# Patient Record
Sex: MALE | Race: WHITE | NOT HISPANIC OR LATINO | URBAN - METROPOLITAN AREA
[De-identification: names, ages, dates, MRNs, and addresses within clinical notes are randomized per-mention and may not be internally consistent; named-entity substitution may affect disease eponyms.]

---

## 2022-11-29 ENCOUNTER — OFFICE (OUTPATIENT)
Dept: URBAN - METROPOLITAN AREA CLINIC 64 | Facility: CLINIC | Age: 42
End: 2022-11-29

## 2022-11-29 VITALS
WEIGHT: 279 LBS | DIASTOLIC BLOOD PRESSURE: 84 MMHG | HEART RATE: 78 BPM | SYSTOLIC BLOOD PRESSURE: 125 MMHG | HEIGHT: 74 IN

## 2022-11-29 DIAGNOSIS — R10.11 RIGHT UPPER QUADRANT PAIN: ICD-10-CM

## 2022-11-29 DIAGNOSIS — B18.2 CHRONIC VIRAL HEPATITIS C: ICD-10-CM

## 2022-11-29 DIAGNOSIS — R74.8 ABNORMAL LEVELS OF OTHER SERUM ENZYMES: ICD-10-CM

## 2022-11-29 PROCEDURE — 99204 OFFICE O/P NEW MOD 45 MIN: CPT

## 2022-12-12 ENCOUNTER — OFFICE VISIT (OUTPATIENT)
Dept: PODIATRY | Facility: CLINIC | Age: 42
End: 2022-12-12

## 2022-12-12 VITALS — BODY MASS INDEX: 34.19 KG/M2 | WEIGHT: 275 LBS | RESPIRATION RATE: 20 BRPM | HEIGHT: 75 IN

## 2022-12-12 DIAGNOSIS — M79.672 BILATERAL FOOT PAIN: Primary | ICD-10-CM

## 2022-12-12 DIAGNOSIS — E11.42 DIABETIC PERIPHERAL NEUROPATHY ASSOCIATED WITH TYPE 2 DIABETES MELLITUS: ICD-10-CM

## 2022-12-12 DIAGNOSIS — M24.573 EQUINUS CONTRACTURE OF ANKLE: ICD-10-CM

## 2022-12-12 DIAGNOSIS — M21.41 PES PLANUS OF BOTH FEET: ICD-10-CM

## 2022-12-12 DIAGNOSIS — M79.671 BILATERAL FOOT PAIN: Primary | ICD-10-CM

## 2022-12-12 DIAGNOSIS — E11.65 TYPE 2 DIABETES MELLITUS WITH HYPERGLYCEMIA, WITH LONG-TERM CURRENT USE OF INSULIN: ICD-10-CM

## 2022-12-12 DIAGNOSIS — Z79.4 TYPE 2 DIABETES MELLITUS WITH HYPERGLYCEMIA, WITH LONG-TERM CURRENT USE OF INSULIN: ICD-10-CM

## 2022-12-12 DIAGNOSIS — M21.42 PES PLANUS OF BOTH FEET: ICD-10-CM

## 2022-12-12 DIAGNOSIS — M20.41 HAMMER TOES OF BOTH FEET: ICD-10-CM

## 2022-12-12 DIAGNOSIS — M20.42 HAMMER TOES OF BOTH FEET: ICD-10-CM

## 2022-12-12 PROCEDURE — 99202 OFFICE O/P NEW SF 15 MIN: CPT

## 2022-12-12 RX ORDER — HYDROCHLOROTHIAZIDE 12.5 MG/1
CAPSULE, GELATIN COATED ORAL
COMMUNITY

## 2022-12-12 RX ORDER — DULOXETIN HYDROCHLORIDE 60 MG/1
CAPSULE, DELAYED RELEASE ORAL
COMMUNITY

## 2022-12-12 RX ORDER — SITAGLIPTIN AND METFORMIN HYDROCHLORIDE 1000; 50 MG/1; MG/1
TABLET, FILM COATED, EXTENDED RELEASE ORAL
COMMUNITY

## 2022-12-12 RX ORDER — ALBUTEROL SULFATE 90 UG/1
AEROSOL, METERED RESPIRATORY (INHALATION)
COMMUNITY

## 2022-12-12 RX ORDER — BUDESONIDE, GLYCOPYRROLATE, AND FORMOTEROL FUMARATE 160; 9; 4.8 UG/1; UG/1; UG/1
2 AEROSOL, METERED RESPIRATORY (INHALATION) 2 TIMES DAILY
COMMUNITY
Start: 2022-11-28

## 2022-12-12 RX ORDER — SEMAGLUTIDE 1.34 MG/ML
INJECTION, SOLUTION SUBCUTANEOUS
COMMUNITY

## 2022-12-12 RX ORDER — DAPAGLIFLOZIN AND SAXAGLIPTIN 10; 5 MG/1; MG/1
1 TABLET, FILM COATED ORAL EVERY MORNING
COMMUNITY
Start: 2022-11-22

## 2022-12-12 RX ORDER — VELPATASVIR AND SOFOSBUVIR 100; 400 MG/1; MG/1
TABLET, FILM COATED ORAL
COMMUNITY

## 2022-12-12 RX ORDER — METFORMIN HYDROCHLORIDE 500 MG/1
TABLET, EXTENDED RELEASE ORAL
COMMUNITY

## 2022-12-12 RX ORDER — NEOMYCIN SULFATE, POLYMYXIN B SULFATE, HYDROCORTISONE 3.5; 10000; 1 MG/ML; [USP'U]/ML; MG/ML
SOLUTION/ DROPS AURICULAR (OTIC)
COMMUNITY

## 2022-12-12 RX ORDER — AMOXICILLIN AND CLAVULANATE POTASSIUM 875; 125 MG/1; MG/1
TABLET, FILM COATED ORAL
COMMUNITY

## 2022-12-12 RX ORDER — GLIMEPIRIDE 4 MG/1
4 TABLET ORAL 2 TIMES DAILY WITH MEALS
COMMUNITY
Start: 2022-11-14

## 2022-12-12 RX ORDER — LOSARTAN POTASSIUM AND HYDROCHLOROTHIAZIDE 25; 100 MG/1; MG/1
1 TABLET ORAL DAILY
COMMUNITY
Start: 2022-11-28

## 2022-12-12 RX ORDER — INSULIN ASPART 100 [IU]/ML
INJECTION, SOLUTION INTRAVENOUS; SUBCUTANEOUS
COMMUNITY

## 2022-12-12 RX ORDER — INSULIN DEGLUDEC INJECTION 100 U/ML
INJECTION, SOLUTION SUBCUTANEOUS
COMMUNITY

## 2022-12-12 RX ORDER — MONTELUKAST SODIUM 10 MG/1
TABLET ORAL
COMMUNITY

## 2022-12-12 RX ORDER — LEVOTHYROXINE SODIUM 0.05 MG/1
50 TABLET ORAL DAILY
COMMUNITY
Start: 2022-11-22

## 2022-12-12 RX ORDER — ONDANSETRON 8 MG/1
TABLET, ORALLY DISINTEGRATING ORAL
COMMUNITY

## 2022-12-12 NOTE — PROGRESS NOTES
12/12/2022  Foot and Ankle Surgery - New Patient   Provider: ALLY Jackson   Location: [facility]    Subjective:  Shaun Shanks is a 42 y.o. male.     Chief Complaint   Patient presents with   • Right Foot - Hammer Toe, Peripheral Neuropathy   • Left Foot - Hammer Toe   • Initial Evaluation     ALAINA Wallace md    • Peripheral Neuropathy       The patient is a 42-year-old male who presents as a new patient for bilateral foot pain.    The patient reports experiencing numbness and decreased sensation in his bilateral feet most of the time as well as an occasional severe burning sensation on the dorsal and plantar aspects when he is ambulating. His symptoms have being ongoing for a couple of years; however, they have recently worsened. He reports having a signifcant amount of cramps as well. He denies any throbbing or aching due to reducing his activity when the pain begins. His symptoms are exacerbated the day after he has been very active. He confirms being diabetic and notes his blood glucose levels continue to be elevated; however, he has a pump ordered that his endocrinology nurse practitioner is able to control remotely. The patient reports his A1c was recently completed and was informed it was elevated. He denies eating carbohydrates and eats a significant amount of salads. He adds previously weighing 600 pounds. He denies being very active nor on his feet often. He also denies any known significant wounds to his feet. He notes having back issues for some time. The patient reports needing new shoes due to his dog destroying the insoles of his current shoes. He denies his feet are cold.    He confirms seeing Dr. Wallace in primary care.    The patient reports being advised to have a surgery for blood clots.    No Known Allergies    Past Medical History:   Diagnosis Date   • Diabetes mellitus (HCC)    • DVT (deep venous thrombosis) ([facility])    • Heart disease    • Hypertension        History  reviewed. No pertinent surgical history.    Family History   Problem Relation Age of Onset   • Heart disease Mother    • Diabetes Father        Social History     Socioeconomic History   • Marital status: Single   Tobacco Use   • Smoking status: Every Day     Types: Cigarettes   • Smokeless tobacco: Never   Vaping Use   • Vaping Use: Never used   Substance and Sexual Activity   • Alcohol use: Not Currently   • Drug use: Never   • Sexual activity: Defer        Current Outpatient Medications on File Prior to Visit   Medication Sig Dispense Refill   • albuterol sulfate  (90 Base) MCG/ACT inhaler albuterol sulfate HFA 90 mcg/actuation aerosol inhaler     • Breztri Aerosphere 160-9-4.8 MCG/ACT aerosol inhaler Inhale 2 puffs 2 (Two) Times a Day.     • DULoxetine (CYMBALTA) 60 MG capsule Cymbalta 60 mg capsule,delayed release     • glimepiride (AMARYL) 4 MG tablet Take 4 mg by mouth 2 (Two) Times a Day With Meals.     • hydroCHLOROthiazide (MICROZIDE) 12.5 MG capsule hydrochlorothiazide 12.5 mg capsule   TAKE ONE CAPSULE BY MOUTH EVERY MORNING     • insulin aspart (NovoLOG FlexPen) 100 UNIT/ML solution pen-injector sc pen Novolog Flexpen U-100 Insulin aspart 100 unit/mL (3 mL) subcutaneous   INJECT 6 UNITS SUBCUTANEOUSLY UP TO 3 TIMES A DAY 15 MINUTES BEFORE MEALS     • insulin degludec (Tresiba FlexTouch) 100 UNIT/ML solution pen-injector injection Tresiba FlexTouch U-100 insulin 100 unit/mL (3 mL) subcutaneous pen   inject 40 UNITS SUBCUTANEOUSLY EVERY MORNING     • levothyroxine (SYNTHROID, LEVOTHROID) 50 MCG tablet Take 50 mcg by mouth Daily.     • losartan-hydrochlorothiazide (HYZAAR) 100-25 MG per tablet Take 1 tablet by mouth Daily.     • metFORMIN ER (GLUCOPHAGE-XR) 500 MG 24 hr tablet metformin  mg tablet,extended release 24 hr   TAKE TWO TABLETS BY MOUTH EVERY MORNING     • montelukast (SINGULAIR) 10 MG tablet montelukast 10 mg tablet     • neomycin-polymyxin-hydrocortisone (CORTISPORIN) 1 % solution  otic solution neomycin-polymyxin-hydrocort 3.5 mg/mL-10,000 unit/mL-1 % ear solution   INSTILL 4 DROPS INTO THE AFFECTED EAR THREE TIMES DAILY FOR 7-10 DAYS     • ondansetron ODT (ZOFRAN-ODT) 8 MG disintegrating tablet ondansetron 8 mg disintegrating tablet   DISSOLVE ONE TABLET UNDER THE TONGUE EVERY 12 HOURS AS NEEDED FOR NAUSEA     • permethrin (NIX) 1 % liquid Lice Treatment (permethrin) 1 % topical liquid   apply a sufficient AMOUNT of shampoo AND allow TO remain ON HAIR FOR 10 MINUTES BEFORE rinsing off with water     • Qtern 10-5 MG tablet Take 1 tablet by mouth Every Morning.     • Semaglutide, 1 MG/DOSE, (Ozempic, 1 MG/DOSE,) 2 MG/1.5ML solution pen-injector Ozempic 1 mg/dose (2 mg/1.5 mL) subcutaneous pen injector   INJECT 1 MG SUBCUTANEOUSLY ONCE a WEEK     • SITagliptin-metFORMIN HCl ER (Janumet XR)  MG tablet Janumet XR 50 mg-1,000 mg tablet,extended release   Take 1 tablet every day by oral route.     • Sofosbuvir-Velpatasvir 400-100 MG tablet Epclusa 400 mg-100 mg tablet     • amoxicillin-clavulanate (AUGMENTIN) 875-125 MG per tablet amoxicillin 875 mg-potassium clavulanate 125 mg tablet       No current facility-administered medications on file prior to visit.       General: Denies fever, chills, fatigue, and weakness.  Eyes: Denies vision loss, blurry vision, and excessive redness.  ENT: Denies hearing issues and difficulty swallowing.  Cardiovascular: Denies palpitations, chest pain, or syncopal episodes.  Respiratory: Denies shortness of breath, wheezing, and coughing.  GI: Denies abdominal pain, nausea, and vomiting.   : Denies frequency, hematuria, and urgency.  Musculoskeletal: Denies joint pains, and stiffness. Positive bilateral feet cramps. Bilateral feet numbness and burning.  Derm: Denies rash, open wounds, or suspicious lesions.  Neuro: Denies headaches, numbness, loss of coordination, and tremors.  Psych: Denies anxiety and depression.  Endocrine: Denies temperature intolerance  "and changes in appetite.  Heme: Denies bleeding disorders or abnormal bruising.     Objective   Resp 20   Ht 190.5 cm (75\")   Wt 125 kg (275 lb)   BMI 34.37 kg/m²     Foot/Ankle Exam:       General:   Appearance: appears stated age and healthy    Orientation: AAOx3    Affect: appropriate      VASCULAR      Right Foot Vascularity   Normal vascular exam    Dorsalis pedis:  2+  Posterior tibial:  2+  Skin Temperature: warm    Edema Grading:  None  CFT:  < 3 seconds  Pedal Hair Growth:  Present  Varicosities: none       Left Foot Vascularity   Normal vascular exam    Dorsalis pedis:  2+  Posterior tibial:  2+  Skin Temperature: warm    Edema Grading:  None  CFT:  < 3 seconds  Pedal Hair Growth:  Present  Varicosities: none       MUSCULOSKELETAL      Right Foot Musculoskeletal   Ecchymosis:  None  Tenderness: none    Arch:  Pes planus     Left Foot Musculoskeletal   Ecchymosis:  None  Tenderness: none    Arch:  Pes planus     MUSCLE STRENGTH     Right Foot Muscle Strength   Normal strength    Foot dorsiflexion:  5  Foot plantar flexion:  5  Foot inversion:  5  Foot eversion:  5     DERMATOLOGIC     Right Foot Dermatologic   Skin: skin intact    Nails: normal       Left Foot Dermatologic   Skin: skin intact    Nails: normal        Right Foot Additional Comments Positive hammertoe. Decreased protective sensation 7 out of 7.      Left Foot Additional Comments: Positive hammertoe. Decreased protective sensation 7 out of 7.        Assessment & Plan   Diagnoses and all orders for this visit:    1. Bilateral foot pain (Primary)  -     XR Foot 3+ View Bilateral    2. Type 2 diabetes mellitus with hyperglycemia, with long-term current use of insulin (HCC)    3. Diabetic peripheral neuropathy associated with type 2 diabetes mellitus (HCC)    4. Hammer toes of both feet    5. Pes planus of both feet    6. Equinus contracture of ankle        The patient's X-ray was reviewed today. I do feel the patient is at moderate risk for " pedal complications related to diabetes. Explained importance of diabetic foot care, daily foot checks, and glycemic control. Patient should check both feet on a daily basis, monitor and control blood sugars, make sure that both feet and in between toes are towel dried after baths or showers. Avoid barefoot walking at all times. Check shoes before putting them on.     Patient was given information on proper foot care. Call the office at the first signs of a wound or with signs of infection.. I do feel the patient is having possible signs and symptoms of neuropathic pain in addition to lack of support and stabilization in his shoe. He was to perform checks on his feet and to not ambulate without shoes. The patient purchased insoles for his shoes today. He will continue working to reduce blood glucose levels.    Will recommend topical pain cream for symptom relief as well as otc insoles and calf stretching exercises.      Orders Placed This Encounter   Procedures   • XR Foot 3+ View Bilateral     Order Specific Question:   Reason for Exam:     Answer:   arnaud hammer toe, neuropathy   room 10  wb     Order Specific Question:   Does this patient have a diabetic monitoring/medication delivering device on?     Answer:   No     Order Specific Question:   Release to patient     Answer:   Routine Release        Transcribed from ambient dictation for ALLY Ordaz by Lennox Saxena.  12/12/22   13:14 EST    Patient or patient representative verbalized consent to the visit recording.  I have personally performed the services described in this document as transcribed by the above individual, and it is both accurate and complete.  ALLY Ordaz  12/12/2022  14:07 EST

## 2023-04-18 ENCOUNTER — OFFICE (OUTPATIENT)
Dept: URBAN - NONMETROPOLITAN AREA CLINIC 10 | Facility: CLINIC | Age: 43
End: 2023-04-18

## 2023-04-18 VITALS
HEIGHT: 74 IN | SYSTOLIC BLOOD PRESSURE: 147 MMHG | HEART RATE: 63 BPM | WEIGHT: 314 LBS | DIASTOLIC BLOOD PRESSURE: 84 MMHG

## 2023-04-18 DIAGNOSIS — K80.20 CALCULUS OF GALLBLADDER WITHOUT CHOLECYSTITIS WITHOUT OBSTRU: ICD-10-CM

## 2023-04-18 DIAGNOSIS — B18.2 CHRONIC VIRAL HEPATITIS C: ICD-10-CM

## 2023-04-18 DIAGNOSIS — J44.9 CHRONIC OBSTRUCTIVE PULMONARY DISEASE, UNSPECIFIED: ICD-10-CM

## 2023-04-18 DIAGNOSIS — R74.8 ABNORMAL LEVELS OF OTHER SERUM ENZYMES: ICD-10-CM

## 2023-04-18 DIAGNOSIS — R76.8 OTHER SPECIFIED ABNORMAL IMMUNOLOGICAL FINDINGS IN SERUM: ICD-10-CM

## 2023-04-18 DIAGNOSIS — E11.9 TYPE 2 DIABETES MELLITUS WITHOUT COMPLICATIONS: ICD-10-CM

## 2023-04-18 DIAGNOSIS — F17.200 NICOTINE DEPENDENCE, UNSPECIFIED, UNCOMPLICATED: ICD-10-CM

## 2023-04-18 PROCEDURE — 99214 OFFICE O/P EST MOD 30 MIN: CPT | Performed by: NURSE PRACTITIONER

## 2023-07-18 ENCOUNTER — OFFICE (OUTPATIENT)
Dept: URBAN - NONMETROPOLITAN AREA CLINIC 10 | Facility: CLINIC | Age: 43
End: 2023-07-18

## 2023-07-18 VITALS
HEART RATE: 99 BPM | DIASTOLIC BLOOD PRESSURE: 75 MMHG | HEIGHT: 74 IN | SYSTOLIC BLOOD PRESSURE: 134 MMHG | WEIGHT: 303 LBS

## 2023-07-18 DIAGNOSIS — R76.8 OTHER SPECIFIED ABNORMAL IMMUNOLOGICAL FINDINGS IN SERUM: ICD-10-CM

## 2023-07-18 DIAGNOSIS — R74.8 ABNORMAL LEVELS OF OTHER SERUM ENZYMES: ICD-10-CM

## 2023-07-18 DIAGNOSIS — K80.20 CALCULUS OF GALLBLADDER WITHOUT CHOLECYSTITIS WITHOUT OBSTRU: ICD-10-CM

## 2023-07-18 DIAGNOSIS — B18.2 CHRONIC VIRAL HEPATITIS C: ICD-10-CM

## 2023-07-18 DIAGNOSIS — R11.2 NAUSEA WITH VOMITING, UNSPECIFIED: ICD-10-CM

## 2023-07-18 PROCEDURE — 99213 OFFICE O/P EST LOW 20 MIN: CPT | Performed by: NURSE PRACTITIONER

## 2023-10-31 ENCOUNTER — OFFICE (OUTPATIENT)
Dept: URBAN - METROPOLITAN AREA CLINIC 64 | Facility: CLINIC | Age: 43
End: 2023-10-31

## 2023-10-31 VITALS
WEIGHT: 300 LBS | HEART RATE: 64 BPM | SYSTOLIC BLOOD PRESSURE: 132 MMHG | HEIGHT: 74 IN | DIASTOLIC BLOOD PRESSURE: 79 MMHG

## 2023-10-31 DIAGNOSIS — R76.8 OTHER SPECIFIED ABNORMAL IMMUNOLOGICAL FINDINGS IN SERUM: ICD-10-CM

## 2023-10-31 DIAGNOSIS — B18.2 CHRONIC VIRAL HEPATITIS C: ICD-10-CM

## 2023-10-31 DIAGNOSIS — R74.8 ABNORMAL LEVELS OF OTHER SERUM ENZYMES: ICD-10-CM

## 2023-10-31 DIAGNOSIS — K80.20 CALCULUS OF GALLBLADDER WITHOUT CHOLECYSTITIS WITHOUT OBSTRU: ICD-10-CM

## 2023-10-31 PROCEDURE — 99214 OFFICE O/P EST MOD 30 MIN: CPT | Performed by: NURSE PRACTITIONER

## 2024-01-30 ENCOUNTER — TELEPHONE (OUTPATIENT)
Dept: ORTHOPEDIC SURGERY | Facility: CLINIC | Age: 44
End: 2024-01-30
Payer: MEDICARE

## 2024-02-06 ENCOUNTER — OFFICE (OUTPATIENT)
Dept: URBAN - METROPOLITAN AREA CLINIC 64 | Facility: CLINIC | Age: 44
End: 2024-02-06

## 2024-02-06 VITALS
DIASTOLIC BLOOD PRESSURE: 67 MMHG | SYSTOLIC BLOOD PRESSURE: 98 MMHG | HEART RATE: 99 BPM | HEIGHT: 74 IN | WEIGHT: 294 LBS

## 2024-02-06 DIAGNOSIS — R10.11 RIGHT UPPER QUADRANT PAIN: ICD-10-CM

## 2024-02-06 DIAGNOSIS — R11.2 NAUSEA WITH VOMITING, UNSPECIFIED: ICD-10-CM

## 2024-02-06 DIAGNOSIS — R76.8 OTHER SPECIFIED ABNORMAL IMMUNOLOGICAL FINDINGS IN SERUM: ICD-10-CM

## 2024-02-06 DIAGNOSIS — B18.2 CHRONIC VIRAL HEPATITIS C: ICD-10-CM

## 2024-02-06 DIAGNOSIS — K80.20 CALCULUS OF GALLBLADDER WITHOUT CHOLECYSTITIS WITHOUT OBSTRU: ICD-10-CM

## 2024-02-06 DIAGNOSIS — R74.8 ABNORMAL LEVELS OF OTHER SERUM ENZYMES: ICD-10-CM

## 2024-02-06 PROCEDURE — 99213 OFFICE O/P EST LOW 20 MIN: CPT | Performed by: NURSE PRACTITIONER

## 2024-02-21 ENCOUNTER — OFFICE VISIT (OUTPATIENT)
Dept: SURGERY | Facility: CLINIC | Age: 44
End: 2024-02-21
Payer: MEDICARE

## 2024-02-21 VITALS
DIASTOLIC BLOOD PRESSURE: 91 MMHG | RESPIRATION RATE: 18 BRPM | OXYGEN SATURATION: 97 % | HEIGHT: 73 IN | WEIGHT: 297.4 LBS | BODY MASS INDEX: 39.42 KG/M2 | TEMPERATURE: 97.3 F | HEART RATE: 93 BPM | SYSTOLIC BLOOD PRESSURE: 130 MMHG

## 2024-02-21 DIAGNOSIS — K80.20 GALLSTONES: Primary | ICD-10-CM

## 2024-02-21 DIAGNOSIS — R94.5 ABNORMAL RESULTS OF LIVER FUNCTION STUDIES: ICD-10-CM

## 2024-02-21 PROCEDURE — 1160F RVW MEDS BY RX/DR IN RCRD: CPT | Performed by: SURGERY

## 2024-02-21 PROCEDURE — 1159F MED LIST DOCD IN RCRD: CPT | Performed by: SURGERY

## 2024-02-21 PROCEDURE — 99204 OFFICE O/P NEW MOD 45 MIN: CPT | Performed by: SURGERY

## 2024-02-21 RX ORDER — SODIUM CHLORIDE 9 MG/ML
100 INJECTION, SOLUTION INTRAVENOUS CONTINUOUS
OUTPATIENT
Start: 2024-02-21

## 2024-02-21 RX ORDER — CHLORHEXIDINE GLUCONATE ORAL RINSE 1.2 MG/ML
15 SOLUTION DENTAL EVERY 12 HOURS SCHEDULED
OUTPATIENT
Start: 2024-02-21

## 2024-02-21 RX ORDER — VELPATASVIR AND SOFOSBUVIR 100; 400 MG/1; MG/1
TABLET, FILM COATED ORAL
COMMUNITY

## 2024-02-21 RX ORDER — BUPROPION HYDROCHLORIDE 150 MG/1
150 TABLET ORAL EVERY MORNING
COMMUNITY
Start: 2024-01-18

## 2024-02-23 NOTE — H&P (VIEW-ONLY)
GENERAL SURGERY CONSULTATION NOTE    Consult requested by: ALLY Corbin    Patient Care Team:  Diann Wallace MD as PCP - General (Family Medicine)  Antoine Ivey MD as Surgeon (General Surgery)    Reason for consult: Gallstones    Subjective     Patient is a 43 y.o. male presents with abdominal pain which started approximately 1 year ago.  He reports waking up in the morning with epigastric discomfort, nausea, vomiting.  He states that at first it was only once or twice per month and now it is 2-3 times per week.  He is followed by gastroenterology for elevated liver enzymes.  He has a history of diabetes with blood sugars ranging from 1  daily, COPD, hepatitis B, and chronic hepatitis C.  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  He had an MRI with MRCP which demonstrated a contracted gallbladder around a 6 mm stone without pericholecystic inflammation.  No abnormal biliary ductal dilatation was noted.  Pancreatic duct appears unremarkable.  There were no acute findings within the abdomen, but the patient was noted to have hepatic steatosis.     Review of Systems   Gastrointestinal:  Positive for abdominal pain, nausea and vomiting.        History  Past Medical History:   Diagnosis Date    Diabetes mellitus     DVT (deep venous thrombosis)     Heart disease     Hypertension      History reviewed. No pertinent surgical history.  Family History   Problem Relation Age of Onset    Heart disease Mother     Diabetes Father      Social History     Tobacco Use    Smoking status: Every Day     Types: Cigarettes     Passive exposure: Current    Smokeless tobacco: Never   Vaping Use    Vaping Use: Some days   Substance Use Topics    Alcohol use: Not Currently    Drug use: Never     (Not in a hospital admission)    Allergies:  Patient has no known allergies.    Objective     Vital Signs  /91 (BP Location: Left  "arm, Patient Position: Sitting, Cuff Size: Large Adult)   Pulse 93   Temp 97.3 °F (36.3 °C) (Infrared)   Resp 18   Ht 185.4 cm (73\")   Wt 135 kg (297 lb 6.4 oz)   SpO2 97%   BMI 39.24 kg/m²      Physical Exam  Vitals reviewed.   Constitutional:       Appearance: He is well-developed. He is obese.   HENT:      Head: Normocephalic and atraumatic.   Eyes:      Pupils: Pupils are equal, round, and reactive to light.   Cardiovascular:      Rate and Rhythm: Normal rate and regular rhythm.   Pulmonary:      Effort: Pulmonary effort is normal.      Breath sounds: Normal breath sounds.   Abdominal:      General: There is no distension.      Palpations: Abdomen is soft.      Tenderness: There is no abdominal tenderness.      Hernia: No hernia is present.   Musculoskeletal:         General: Normal range of motion.      Cervical back: Normal range of motion.   Lymphadenopathy:      Cervical: No cervical adenopathy.   Skin:     General: Skin is warm and dry.      Findings: No rash.   Neurological:      Mental Status: He is alert and oriented to person, place, and time.         Results Review:   Lab Results (last 24 hours)       ** No results found for the last 24 hours. **          No radiology results for the last day      I reviewed the patient's new imaging results and agree with the interpretation.  I reviewed the patient's other test results and agree with the interpretation    Assessment & Plan   Gallstones    I discussed with the patient the findings of gallstones, and reviewed the natural history of all stones as well as management options which include both operative and nonoperative strategies.  We discussed the risk, benefits, and alternatives to laparoscopic cholecystectomy which include but are not limited to: bleeding, infection, damage to surrounding structures including bowel and common bile duct, cystic stump leak, and possible need to convert to open.  The patient understands, and agrees to proceed with " surgery.   It was discussed with the patient that his symptoms may be multifactorial given his hepatitis C and hepatic steatosis.  This may not make a difference with regards to his elevated liver enzymes either.    I discussed the patients findings and my recommendations with the patient.     Antoine Ivey MD  02/23/24  10:04 EST

## 2024-03-01 RX ORDER — PREGABALIN 50 MG/1
50 CAPSULE ORAL 2 TIMES DAILY
COMMUNITY

## 2024-03-06 ENCOUNTER — HOSPITAL ENCOUNTER (OUTPATIENT)
Dept: CARDIOLOGY | Facility: HOSPITAL | Age: 44
Discharge: HOME | End: 2024-03-06
Payer: MEDICARE

## 2024-03-06 ENCOUNTER — HOSPITAL ENCOUNTER (OUTPATIENT)
Dept: GENERAL RADIOLOGY | Facility: HOSPITAL | Age: 44
Discharge: HOME | End: 2024-03-06
Payer: MEDICARE

## 2024-03-06 ENCOUNTER — LAB (OUTPATIENT)
Dept: LAB | Facility: HOSPITAL | Age: 44
End: 2024-03-06
Payer: MEDICARE

## 2024-03-06 DIAGNOSIS — R94.5 ABNORMAL RESULTS OF LIVER FUNCTION STUDIES: ICD-10-CM

## 2024-03-06 DIAGNOSIS — K80.20 GALLSTONES: ICD-10-CM

## 2024-03-06 LAB
ALBUMIN SERPL-MCNC: 4.5 G/DL (ref 3.5–5.2)
ALBUMIN/GLOB SERPL: 1.2 G/DL
ALP SERPL-CCNC: 127 U/L (ref 39–117)
ALT SERPL W P-5'-P-CCNC: 37 U/L (ref 1–41)
ANION GAP SERPL CALCULATED.3IONS-SCNC: 13.7 MMOL/L (ref 5–15)
AST SERPL-CCNC: 28 U/L (ref 1–40)
BASOPHILS # BLD AUTO: 0.08 10*3/MM3 (ref 0–0.2)
BASOPHILS NFR BLD AUTO: 0.8 % (ref 0–1.5)
BILIRUB SERPL-MCNC: 0.4 MG/DL (ref 0–1.2)
BUN SERPL-MCNC: 16 MG/DL (ref 6–20)
BUN/CREAT SERPL: 14.8 (ref 7–25)
CALCIUM SPEC-SCNC: 9.5 MG/DL (ref 8.6–10.5)
CHLORIDE SERPL-SCNC: 98 MMOL/L (ref 98–107)
CO2 SERPL-SCNC: 25.3 MMOL/L (ref 22–29)
CREAT SERPL-MCNC: 1.08 MG/DL (ref 0.76–1.27)
DEPRECATED RDW RBC AUTO: 39.9 FL (ref 37–54)
EGFRCR SERPLBLD CKD-EPI 2021: 86.2 ML/MIN/1.73
EOSINOPHIL # BLD AUTO: 0.22 10*3/MM3 (ref 0–0.4)
EOSINOPHIL NFR BLD AUTO: 2.3 % (ref 0.3–6.2)
ERYTHROCYTE [DISTWIDTH] IN BLOOD BY AUTOMATED COUNT: 12.3 % (ref 12.3–15.4)
GLOBULIN UR ELPH-MCNC: 3.8 GM/DL
GLUCOSE SERPL-MCNC: 108 MG/DL (ref 65–99)
HCT VFR BLD AUTO: 45.3 % (ref 37.5–51)
HGB BLD-MCNC: 15.2 G/DL (ref 13–17.7)
IMM GRANULOCYTES # BLD AUTO: 0.03 10*3/MM3 (ref 0–0.05)
IMM GRANULOCYTES NFR BLD AUTO: 0.3 % (ref 0–0.5)
INR PPP: 0.94 (ref 0.93–1.1)
LYMPHOCYTES # BLD AUTO: 2.83 10*3/MM3 (ref 0.7–3.1)
LYMPHOCYTES NFR BLD AUTO: 29.5 % (ref 19.6–45.3)
MCH RBC QN AUTO: 30.5 PG (ref 26.6–33)
MCHC RBC AUTO-ENTMCNC: 33.6 G/DL (ref 31.5–35.7)
MCV RBC AUTO: 91 FL (ref 79–97)
MONOCYTES # BLD AUTO: 0.59 10*3/MM3 (ref 0.1–0.9)
MONOCYTES NFR BLD AUTO: 6.2 % (ref 5–12)
NEUTROPHILS NFR BLD AUTO: 5.83 10*3/MM3 (ref 1.7–7)
NEUTROPHILS NFR BLD AUTO: 60.9 % (ref 42.7–76)
NRBC BLD AUTO-RTO: 0 /100 WBC (ref 0–0.2)
PLATELET # BLD AUTO: 231 10*3/MM3 (ref 140–450)
PMV BLD AUTO: 11.6 FL (ref 6–12)
POTASSIUM SERPL-SCNC: 4.4 MMOL/L (ref 3.5–5.2)
PROT SERPL-MCNC: 8.3 G/DL (ref 6–8.5)
PROTHROMBIN TIME: 10.3 SECONDS (ref 9.6–11.7)
RBC # BLD AUTO: 4.98 10*6/MM3 (ref 4.14–5.8)
SODIUM SERPL-SCNC: 137 MMOL/L (ref 136–145)
WBC NRBC COR # BLD AUTO: 9.58 10*3/MM3 (ref 3.4–10.8)

## 2024-03-06 PROCEDURE — 36415 COLL VENOUS BLD VENIPUNCTURE: CPT

## 2024-03-06 PROCEDURE — 71046 X-RAY EXAM CHEST 2 VIEWS: CPT

## 2024-03-06 PROCEDURE — 93005 ELECTROCARDIOGRAM TRACING: CPT | Performed by: SURGERY

## 2024-03-06 PROCEDURE — 85025 COMPLETE CBC W/AUTO DIFF WBC: CPT

## 2024-03-06 PROCEDURE — 85610 PROTHROMBIN TIME: CPT

## 2024-03-06 PROCEDURE — 80053 COMPREHEN METABOLIC PANEL: CPT

## 2024-03-11 LAB
QT INTERVAL: 360 MS
QTC INTERVAL: 416 MS

## 2024-03-13 ENCOUNTER — ANESTHESIA EVENT (OUTPATIENT)
Dept: PERIOP | Facility: HOSPITAL | Age: 44
End: 2024-03-13
Payer: MEDICARE

## 2024-03-14 ENCOUNTER — HOSPITAL ENCOUNTER (INPATIENT)
Facility: HOSPITAL | Age: 44
LOS: 6 days | Discharge: HOME | DRG: 330 | End: 2024-03-20
Attending: SURGERY | Admitting: SURGERY
Payer: MEDICARE

## 2024-03-14 ENCOUNTER — ANESTHESIA (OUTPATIENT)
Dept: PERIOP | Facility: HOSPITAL | Age: 44
End: 2024-03-14
Payer: MEDICARE

## 2024-03-14 DIAGNOSIS — K31.6 DUODENAL FISTULA: Primary | ICD-10-CM

## 2024-03-14 DIAGNOSIS — K80.20 GALLSTONES: ICD-10-CM

## 2024-03-14 PROBLEM — Z90.49 S/P CHOLECYSTECTOMY: Status: ACTIVE | Noted: 2024-03-14

## 2024-03-14 LAB
GLUCOSE BLDC GLUCOMTR-MCNC: 138 MG/DL (ref 70–105)
GLUCOSE BLDC GLUCOMTR-MCNC: 175 MG/DL (ref 70–105)
GLUCOSE BLDC GLUCOMTR-MCNC: 210 MG/DL (ref 70–105)

## 2024-03-14 PROCEDURE — 25010000002 MIDAZOLAM PER 1 MG: Performed by: NURSE ANESTHETIST, CERTIFIED REGISTERED

## 2024-03-14 PROCEDURE — 0FT40ZZ RESECTION OF GALLBLADDER, OPEN APPROACH: ICD-10-PCS | Performed by: SURGERY

## 2024-03-14 PROCEDURE — 94640 AIRWAY INHALATION TREATMENT: CPT

## 2024-03-14 PROCEDURE — 25810000003 SODIUM CHLORIDE 0.9 % SOLUTION: Performed by: SURGERY

## 2024-03-14 PROCEDURE — 44602 SUTURE SMALL INTESTINE: CPT | Performed by: SURGERY

## 2024-03-14 PROCEDURE — 25010000002 HYDROMORPHONE 1 MG/ML SOLUTION: Performed by: NURSE ANESTHETIST, CERTIFIED REGISTERED

## 2024-03-14 PROCEDURE — 44602 SUTURE SMALL INTESTINE: CPT | Performed by: NURSE PRACTITIONER

## 2024-03-14 PROCEDURE — 25010000002 SUGAMMADEX 200 MG/2ML SOLUTION: Performed by: NURSE ANESTHETIST, CERTIFIED REGISTERED

## 2024-03-14 PROCEDURE — 25010000002 MAGNESIUM SULFATE PER 500 MG OF MAGNESIUM: Performed by: NURSE ANESTHETIST, CERTIFIED REGISTERED

## 2024-03-14 PROCEDURE — 25810000003 LACTATED RINGERS PER 1000 ML: Performed by: ANESTHESIOLOGY

## 2024-03-14 PROCEDURE — 25010000002 HYDRALAZINE PER 20 MG: Performed by: NURSE ANESTHETIST, CERTIFIED REGISTERED

## 2024-03-14 PROCEDURE — 25010000002 LABETALOL 5 MG/ML SOLUTION: Performed by: NURSE ANESTHETIST, CERTIFIED REGISTERED

## 2024-03-14 PROCEDURE — 25010000002 CEFOXITIN PER 1 G: Performed by: SURGERY

## 2024-03-14 PROCEDURE — 25010000002 DEXAMETHASONE PER 1 MG: Performed by: NURSE ANESTHETIST, CERTIFIED REGISTERED

## 2024-03-14 PROCEDURE — 25010000002 BUPIVACAINE (PF) 0.25 % SOLUTION: Performed by: SURGERY

## 2024-03-14 PROCEDURE — 0DU907Z SUPPLEMENT DUODENUM WITH AUTOLOGOUS TISSUE SUBSTITUTE, OPEN APPROACH: ICD-10-PCS | Performed by: SURGERY

## 2024-03-14 PROCEDURE — 82948 REAGENT STRIP/BLOOD GLUCOSE: CPT

## 2024-03-14 PROCEDURE — 94761 N-INVAS EAR/PLS OXIMETRY MLT: CPT

## 2024-03-14 PROCEDURE — 25010000002 PIPERACILLIN SOD-TAZOBACTAM PER 1 G: Performed by: SURGERY

## 2024-03-14 PROCEDURE — 25010000002 FENTANYL CITRATE (PF) 100 MCG/2ML SOLUTION: Performed by: NURSE ANESTHETIST, CERTIFIED REGISTERED

## 2024-03-14 PROCEDURE — 25010000002 HYDROMORPHONE 1 MG/ML SOLUTION: Performed by: SURGERY

## 2024-03-14 PROCEDURE — 25010000002 PROPOFOL 200 MG/20ML EMULSION: Performed by: NURSE ANESTHETIST, CERTIFIED REGISTERED

## 2024-03-14 PROCEDURE — 25010000002 ONDANSETRON PER 1 MG: Performed by: NURSE ANESTHETIST, CERTIFIED REGISTERED

## 2024-03-14 PROCEDURE — 94799 UNLISTED PULMONARY SVC/PX: CPT

## 2024-03-14 DEVICE — LIGAMAX 5 MM ENDOSCOPIC MULTIPLE CLIP APPLIER
Type: IMPLANTABLE DEVICE | Site: ABDOMEN | Status: FUNCTIONAL
Brand: LIGAMAX

## 2024-03-14 RX ORDER — HYDRALAZINE HYDROCHLORIDE 20 MG/ML
10 INJECTION INTRAMUSCULAR; INTRAVENOUS EVERY 6 HOURS PRN
Status: DISCONTINUED | OUTPATIENT
Start: 2024-03-14 | End: 2024-03-16 | Stop reason: SDUPTHER

## 2024-03-14 RX ORDER — CHLORHEXIDINE GLUCONATE ORAL RINSE 1.2 MG/ML
15 SOLUTION DENTAL EVERY 12 HOURS SCHEDULED
Status: DISCONTINUED | OUTPATIENT
Start: 2024-03-14 | End: 2024-03-14

## 2024-03-14 RX ORDER — ONDANSETRON 2 MG/ML
4 INJECTION INTRAMUSCULAR; INTRAVENOUS ONCE AS NEEDED
Status: DISCONTINUED | OUTPATIENT
Start: 2024-03-14 | End: 2024-03-14 | Stop reason: HOSPADM

## 2024-03-14 RX ORDER — ONDANSETRON 2 MG/ML
4 INJECTION INTRAMUSCULAR; INTRAVENOUS EVERY 6 HOURS PRN
Status: DISCONTINUED | OUTPATIENT
Start: 2024-03-14 | End: 2024-03-20 | Stop reason: HOSPADM

## 2024-03-14 RX ORDER — OLANZAPINE 10 MG/2ML
5 INJECTION, POWDER, LYOPHILIZED, FOR SOLUTION INTRAMUSCULAR EVERY 8 HOURS PRN
Status: DISCONTINUED | OUTPATIENT
Start: 2024-03-14 | End: 2024-03-20 | Stop reason: HOSPADM

## 2024-03-14 RX ORDER — BUPIVACAINE HYDROCHLORIDE 2.5 MG/ML
INJECTION, SOLUTION EPIDURAL; INFILTRATION; INTRACAUDAL AS NEEDED
Status: DISCONTINUED | OUTPATIENT
Start: 2024-03-14 | End: 2024-03-14 | Stop reason: HOSPADM

## 2024-03-14 RX ORDER — PANTOPRAZOLE SODIUM 40 MG/10ML
40 INJECTION, POWDER, LYOPHILIZED, FOR SOLUTION INTRAVENOUS EVERY 12 HOURS SCHEDULED
Status: DISCONTINUED | OUTPATIENT
Start: 2024-03-14 | End: 2024-03-16

## 2024-03-14 RX ORDER — LIDOCAINE HYDROCHLORIDE 20 MG/ML
INJECTION, SOLUTION EPIDURAL; INFILTRATION; INTRACAUDAL; PERINEURAL AS NEEDED
Status: DISCONTINUED | OUTPATIENT
Start: 2024-03-14 | End: 2024-03-14 | Stop reason: SURG

## 2024-03-14 RX ORDER — MEPERIDINE HYDROCHLORIDE 25 MG/ML
12.5 INJECTION INTRAMUSCULAR; INTRAVENOUS; SUBCUTANEOUS
Status: DISCONTINUED | OUTPATIENT
Start: 2024-03-14 | End: 2024-03-14 | Stop reason: HOSPADM

## 2024-03-14 RX ORDER — DEXMEDETOMIDINE HYDROCHLORIDE 100 UG/ML
INJECTION, SOLUTION INTRAVENOUS AS NEEDED
Status: DISCONTINUED | OUTPATIENT
Start: 2024-03-14 | End: 2024-03-14 | Stop reason: SURG

## 2024-03-14 RX ORDER — LABETALOL HYDROCHLORIDE 5 MG/ML
5 INJECTION, SOLUTION INTRAVENOUS
Status: DISCONTINUED | OUTPATIENT
Start: 2024-03-14 | End: 2024-03-14 | Stop reason: HOSPADM

## 2024-03-14 RX ORDER — FLUMAZENIL 0.1 MG/ML
0.2 INJECTION INTRAVENOUS AS NEEDED
Status: DISCONTINUED | OUTPATIENT
Start: 2024-03-14 | End: 2024-03-14 | Stop reason: HOSPADM

## 2024-03-14 RX ORDER — MIDAZOLAM HYDROCHLORIDE 1 MG/ML
INJECTION INTRAMUSCULAR; INTRAVENOUS AS NEEDED
Status: DISCONTINUED | OUTPATIENT
Start: 2024-03-14 | End: 2024-03-14 | Stop reason: SURG

## 2024-03-14 RX ORDER — OXYCODONE HYDROCHLORIDE 5 MG/1
5 TABLET ORAL ONCE AS NEEDED
Status: DISCONTINUED | OUTPATIENT
Start: 2024-03-14 | End: 2024-03-14 | Stop reason: HOSPADM

## 2024-03-14 RX ORDER — ROCURONIUM BROMIDE 10 MG/ML
INJECTION, SOLUTION INTRAVENOUS AS NEEDED
Status: DISCONTINUED | OUTPATIENT
Start: 2024-03-14 | End: 2024-03-14 | Stop reason: SURG

## 2024-03-14 RX ORDER — IPRATROPIUM BROMIDE AND ALBUTEROL SULFATE 2.5; .5 MG/3ML; MG/3ML
3 SOLUTION RESPIRATORY (INHALATION) ONCE AS NEEDED
Status: COMPLETED | OUTPATIENT
Start: 2024-03-14 | End: 2024-03-14

## 2024-03-14 RX ORDER — OXYCODONE HYDROCHLORIDE 5 MG/1
10 TABLET ORAL EVERY 4 HOURS PRN
Status: DISCONTINUED | OUTPATIENT
Start: 2024-03-14 | End: 2024-03-14 | Stop reason: HOSPADM

## 2024-03-14 RX ORDER — NALOXONE HCL 0.4 MG/ML
0.4 VIAL (ML) INJECTION
Status: DISCONTINUED | OUTPATIENT
Start: 2024-03-14 | End: 2024-03-20 | Stop reason: HOSPADM

## 2024-03-14 RX ORDER — ONDANSETRON 4 MG/1
4 TABLET, ORALLY DISINTEGRATING ORAL EVERY 6 HOURS PRN
Status: DISCONTINUED | OUTPATIENT
Start: 2024-03-14 | End: 2024-03-20 | Stop reason: HOSPADM

## 2024-03-14 RX ORDER — KETAMINE HCL IN NACL, ISO-OSM 100MG/10ML
SYRINGE (ML) INJECTION AS NEEDED
Status: DISCONTINUED | OUTPATIENT
Start: 2024-03-14 | End: 2024-03-14 | Stop reason: SURG

## 2024-03-14 RX ORDER — SODIUM CHLORIDE 9 MG/ML
100 INJECTION, SOLUTION INTRAVENOUS CONTINUOUS
Status: DISCONTINUED | OUTPATIENT
Start: 2024-03-14 | End: 2024-03-14

## 2024-03-14 RX ORDER — PROPOFOL 10 MG/ML
INJECTION, EMULSION INTRAVENOUS AS NEEDED
Status: DISCONTINUED | OUTPATIENT
Start: 2024-03-14 | End: 2024-03-14 | Stop reason: SURG

## 2024-03-14 RX ORDER — DIPHENHYDRAMINE HYDROCHLORIDE 50 MG/ML
12.5 INJECTION INTRAMUSCULAR; INTRAVENOUS ONCE AS NEEDED
Status: DISCONTINUED | OUTPATIENT
Start: 2024-03-14 | End: 2024-03-14 | Stop reason: HOSPADM

## 2024-03-14 RX ORDER — FENTANYL CITRATE 50 UG/ML
INJECTION, SOLUTION INTRAMUSCULAR; INTRAVENOUS AS NEEDED
Status: DISCONTINUED | OUTPATIENT
Start: 2024-03-14 | End: 2024-03-14 | Stop reason: SURG

## 2024-03-14 RX ORDER — NALOXONE HCL 0.4 MG/ML
0.4 VIAL (ML) INJECTION AS NEEDED
Status: DISCONTINUED | OUTPATIENT
Start: 2024-03-14 | End: 2024-03-14 | Stop reason: HOSPADM

## 2024-03-14 RX ORDER — SODIUM CHLORIDE 0.9 % (FLUSH) 0.9 %
10 SYRINGE (ML) INJECTION AS NEEDED
Status: DISCONTINUED | OUTPATIENT
Start: 2024-03-14 | End: 2024-03-14 | Stop reason: HOSPADM

## 2024-03-14 RX ORDER — DEXAMETHASONE SODIUM PHOSPHATE 4 MG/ML
INJECTION, SOLUTION INTRA-ARTICULAR; INTRALESIONAL; INTRAMUSCULAR; INTRAVENOUS; SOFT TISSUE AS NEEDED
Status: DISCONTINUED | OUTPATIENT
Start: 2024-03-14 | End: 2024-03-14 | Stop reason: SURG

## 2024-03-14 RX ORDER — SODIUM CHLORIDE, SODIUM LACTATE, POTASSIUM CHLORIDE, CALCIUM CHLORIDE 600; 310; 30; 20 MG/100ML; MG/100ML; MG/100ML; MG/100ML
9 INJECTION, SOLUTION INTRAVENOUS CONTINUOUS PRN
Status: DISCONTINUED | OUTPATIENT
Start: 2024-03-14 | End: 2024-03-14 | Stop reason: HOSPADM

## 2024-03-14 RX ORDER — HYDRALAZINE HYDROCHLORIDE 20 MG/ML
5 INJECTION INTRAMUSCULAR; INTRAVENOUS
Status: DISCONTINUED | OUTPATIENT
Start: 2024-03-14 | End: 2024-03-14 | Stop reason: HOSPADM

## 2024-03-14 RX ORDER — ALBUTEROL SULFATE 90 UG/1
2 AEROSOL, METERED RESPIRATORY (INHALATION) EVERY 4 HOURS PRN
Status: DISCONTINUED | OUTPATIENT
Start: 2024-03-14 | End: 2024-03-20 | Stop reason: HOSPADM

## 2024-03-14 RX ORDER — ONDANSETRON 2 MG/ML
INJECTION INTRAMUSCULAR; INTRAVENOUS AS NEEDED
Status: DISCONTINUED | OUTPATIENT
Start: 2024-03-14 | End: 2024-03-14 | Stop reason: SURG

## 2024-03-14 RX ORDER — LABETALOL HYDROCHLORIDE 5 MG/ML
INJECTION, SOLUTION INTRAVENOUS AS NEEDED
Status: DISCONTINUED | OUTPATIENT
Start: 2024-03-14 | End: 2024-03-14 | Stop reason: SURG

## 2024-03-14 RX ORDER — SODIUM CHLORIDE 9 MG/ML
100 INJECTION, SOLUTION INTRAVENOUS CONTINUOUS
Status: DISCONTINUED | OUTPATIENT
Start: 2024-03-14 | End: 2024-03-20 | Stop reason: HOSPADM

## 2024-03-14 RX ORDER — MAGNESIUM SULFATE HEPTAHYDRATE 500 MG/ML
INJECTION, SOLUTION INTRAMUSCULAR; INTRAVENOUS AS NEEDED
Status: DISCONTINUED | OUTPATIENT
Start: 2024-03-14 | End: 2024-03-14 | Stop reason: SURG

## 2024-03-14 RX ORDER — NALOXONE HCL 0.4 MG/ML
0.1 VIAL (ML) INJECTION
Status: DISCONTINUED | OUTPATIENT
Start: 2024-03-14 | End: 2024-03-20 | Stop reason: HOSPADM

## 2024-03-14 RX ORDER — EPHEDRINE SULFATE 5 MG/ML
5 INJECTION INTRAVENOUS ONCE AS NEEDED
Status: DISCONTINUED | OUTPATIENT
Start: 2024-03-14 | End: 2024-03-14 | Stop reason: HOSPADM

## 2024-03-14 RX ORDER — SODIUM CHLORIDE 0.9 % (FLUSH) 0.9 %
10 SYRINGE (ML) INJECTION EVERY 12 HOURS SCHEDULED
Status: DISCONTINUED | OUTPATIENT
Start: 2024-03-14 | End: 2024-03-14 | Stop reason: HOSPADM

## 2024-03-14 RX ORDER — DIPHENHYDRAMINE HYDROCHLORIDE 50 MG/ML
12.5 INJECTION INTRAMUSCULAR; INTRAVENOUS
Status: DISCONTINUED | OUTPATIENT
Start: 2024-03-14 | End: 2024-03-14 | Stop reason: HOSPADM

## 2024-03-14 RX ORDER — IPRATROPIUM BROMIDE AND ALBUTEROL SULFATE 2.5; .5 MG/3ML; MG/3ML
3 SOLUTION RESPIRATORY (INHALATION)
Status: DISCONTINUED | OUTPATIENT
Start: 2024-03-14 | End: 2024-03-20 | Stop reason: HOSPADM

## 2024-03-14 RX ADMIN — 0.12% CHLORHEXIDINE GLUCONATE 15 ML: 1.2 RINSE ORAL at 09:41

## 2024-03-14 RX ADMIN — FENTANYL CITRATE 50 MCG: 50 INJECTION, SOLUTION INTRAMUSCULAR; INTRAVENOUS at 12:30

## 2024-03-14 RX ADMIN — DEXMEDETOMIDINE HCL 2 MCG: 100 INJECTION INTRAVENOUS at 15:10

## 2024-03-14 RX ADMIN — CEFOXITIN 2 G: 2 INJECTION, POWDER, FOR SOLUTION INTRAVENOUS at 12:34

## 2024-03-14 RX ADMIN — ROCURONIUM BROMIDE 10 MG: 10 INJECTION, SOLUTION INTRAVENOUS at 12:55

## 2024-03-14 RX ADMIN — IPRATROPIUM BROMIDE AND ALBUTEROL SULFATE 3 ML: .5; 2.5 SOLUTION RESPIRATORY (INHALATION) at 15:45

## 2024-03-14 RX ADMIN — ROCURONIUM BROMIDE 10 MG: 10 INJECTION, SOLUTION INTRAVENOUS at 13:58

## 2024-03-14 RX ADMIN — SODIUM CHLORIDE 100 ML/HR: 9 INJECTION, SOLUTION INTRAVENOUS at 20:34

## 2024-03-14 RX ADMIN — MIDAZOLAM 2 MG: 1 INJECTION INTRAMUSCULAR; INTRAVENOUS at 15:13

## 2024-03-14 RX ADMIN — IPRATROPIUM BROMIDE AND ALBUTEROL SULFATE 3 ML: .5; 3 SOLUTION RESPIRATORY (INHALATION) at 20:15

## 2024-03-14 RX ADMIN — ROCURONIUM BROMIDE 20 MG: 10 INJECTION, SOLUTION INTRAVENOUS at 13:25

## 2024-03-14 RX ADMIN — ONDANSETRON 4 MG: 2 INJECTION INTRAMUSCULAR; INTRAVENOUS at 14:54

## 2024-03-14 RX ADMIN — PIPERACILLIN AND TAZOBACTAM 4.5 G: 4; .5 INJECTION, POWDER, FOR SOLUTION INTRAVENOUS at 18:45

## 2024-03-14 RX ADMIN — PROPOFOL 20 MG: 10 INJECTION, EMULSION INTRAVENOUS at 15:07

## 2024-03-14 RX ADMIN — HYDROMORPHONE HYDROCHLORIDE 0.5 MG: 1 INJECTION, SOLUTION INTRAMUSCULAR; INTRAVENOUS; SUBCUTANEOUS at 18:45

## 2024-03-14 RX ADMIN — PROPOFOL 200 MG: 10 INJECTION, EMULSION INTRAVENOUS at 12:30

## 2024-03-14 RX ADMIN — Medication 20 MG: at 13:57

## 2024-03-14 RX ADMIN — DEXAMETHASONE SODIUM PHOSPHATE 4 MG: 4 INJECTION, SOLUTION INTRAMUSCULAR; INTRAVENOUS at 12:49

## 2024-03-14 RX ADMIN — LABETALOL 20 MG/4 ML (5 MG/ML) INTRAVENOUS SYRINGE 5 MG: at 13:00

## 2024-03-14 RX ADMIN — DEXMEDETOMIDINE HCL 4 MCG: 100 INJECTION INTRAVENOUS at 15:07

## 2024-03-14 RX ADMIN — SODIUM CHLORIDE, POTASSIUM CHLORIDE, SODIUM LACTATE AND CALCIUM CHLORIDE 9 ML/HR: 600; 310; 30; 20 INJECTION, SOLUTION INTRAVENOUS at 09:41

## 2024-03-14 RX ADMIN — Medication 20 MG: at 14:14

## 2024-03-14 RX ADMIN — PANTOPRAZOLE SODIUM 40 MG: 40 INJECTION, POWDER, FOR SOLUTION INTRAVENOUS at 20:34

## 2024-03-14 RX ADMIN — HYDROMORPHONE HYDROCHLORIDE 0.5 MG: 1 INJECTION, SOLUTION INTRAMUSCULAR; INTRAVENOUS; SUBCUTANEOUS at 20:54

## 2024-03-14 RX ADMIN — HYDROMORPHONE HYDROCHLORIDE 0.5 MG: 1 INJECTION, SOLUTION INTRAMUSCULAR; INTRAVENOUS; SUBCUTANEOUS at 12:54

## 2024-03-14 RX ADMIN — HYDROMORPHONE HYDROCHLORIDE 1 MG: 1 INJECTION, SOLUTION INTRAMUSCULAR; INTRAVENOUS; SUBCUTANEOUS at 15:48

## 2024-03-14 RX ADMIN — Medication 5 MG: at 16:04

## 2024-03-14 RX ADMIN — HYDROMORPHONE HYDROCHLORIDE 0.5 MG: 1 INJECTION, SOLUTION INTRAMUSCULAR; INTRAVENOUS; SUBCUTANEOUS at 15:13

## 2024-03-14 RX ADMIN — ROCURONIUM BROMIDE 40 MG: 10 INJECTION, SOLUTION INTRAVENOUS at 12:30

## 2024-03-14 RX ADMIN — HYDROMORPHONE HYDROCHLORIDE 0.5 MG: 1 INJECTION, SOLUTION INTRAMUSCULAR; INTRAVENOUS; SUBCUTANEOUS at 15:00

## 2024-03-14 RX ADMIN — Medication 5 MG: at 17:30

## 2024-03-14 RX ADMIN — SODIUM CHLORIDE, POTASSIUM CHLORIDE, SODIUM LACTATE AND CALCIUM CHLORIDE: 600; 310; 30; 20 INJECTION, SOLUTION INTRAVENOUS at 14:04

## 2024-03-14 RX ADMIN — DEXMEDETOMIDINE HCL 2 MCG: 100 INJECTION INTRAVENOUS at 15:00

## 2024-03-14 RX ADMIN — CEFOXITIN 2 G: 2 INJECTION, POWDER, FOR SOLUTION INTRAVENOUS at 14:34

## 2024-03-14 RX ADMIN — HYDROMORPHONE HYDROCHLORIDE 0.5 MG: 1 INJECTION, SOLUTION INTRAMUSCULAR; INTRAVENOUS; SUBCUTANEOUS at 12:47

## 2024-03-14 RX ADMIN — HYDROMORPHONE HYDROCHLORIDE 0.5 MG: 1 INJECTION, SOLUTION INTRAMUSCULAR; INTRAVENOUS; SUBCUTANEOUS at 15:07

## 2024-03-14 RX ADMIN — MIDAZOLAM 2 MG: 1 INJECTION INTRAMUSCULAR; INTRAVENOUS at 12:24

## 2024-03-14 RX ADMIN — MAGNESIUM SULFATE HEPTAHYDRATE 2 G: 500 INJECTION, SOLUTION INTRAMUSCULAR; INTRAVENOUS at 13:57

## 2024-03-14 RX ADMIN — SUGAMMADEX 200 MG: 100 INJECTION, SOLUTION INTRAVENOUS at 14:56

## 2024-03-14 RX ADMIN — HYDROMORPHONE HYDROCHLORIDE 0.5 MG: 1 INJECTION, SOLUTION INTRAMUSCULAR; INTRAVENOUS; SUBCUTANEOUS at 14:14

## 2024-03-14 RX ADMIN — LIDOCAINE HYDROCHLORIDE 40 MG: 20 INJECTION, SOLUTION EPIDURAL; INFILTRATION; INTRACAUDAL; PERINEURAL at 12:30

## 2024-03-14 RX ADMIN — HYDROMORPHONE HYDROCHLORIDE 1 MG: 1 INJECTION, SOLUTION INTRAMUSCULAR; INTRAVENOUS; SUBCUTANEOUS at 16:05

## 2024-03-14 RX ADMIN — DEXMEDETOMIDINE HCL 2 MCG: 100 INJECTION INTRAVENOUS at 15:09

## 2024-03-14 RX ADMIN — HYDRALAZINE HYDROCHLORIDE 5 MG: 20 INJECTION INTRAMUSCULAR; INTRAVENOUS at 18:00

## 2024-03-14 RX ADMIN — ROCURONIUM BROMIDE 10 MG: 10 INJECTION, SOLUTION INTRAVENOUS at 13:41

## 2024-03-14 RX ADMIN — MUPIROCIN 2 APPLICATION: 20 OINTMENT TOPICAL at 09:41

## 2024-03-14 RX ADMIN — FENTANYL CITRATE 50 MCG: 50 INJECTION, SOLUTION INTRAMUSCULAR; INTRAVENOUS at 12:34

## 2024-03-14 RX ADMIN — DEXMEDETOMIDINE HCL 4 MCG: 100 INJECTION INTRAVENOUS at 14:44

## 2024-03-14 RX ADMIN — DEXMEDETOMIDINE HCL 6 MCG: 100 INJECTION INTRAVENOUS at 14:39

## 2024-03-14 RX ADMIN — PROPOFOL 20 MG: 10 INJECTION, EMULSION INTRAVENOUS at 15:04

## 2024-03-14 NOTE — ANESTHESIA PROCEDURE NOTES
Airway  Urgency: elective    Date/Time: 3/14/2024 12:32 PM  Airway not difficult    General Information and Staff    Patient location during procedure: OR  CRNA/CAA: Maryana Baez, CRNA    Indications and Patient Condition  Indications for airway management: airway protection    Preoxygenated: yes  MILS maintained throughout  Mask difficulty assessment: 2 - vent by mask + OA or adjuvant +/- NMBA    Final Airway Details  Final airway type: endotracheal airway      Successful airway: ETT  Cuffed: yes   Successful intubation technique: video laryngoscopy  Facilitating devices/methods: intubating stylet  Endotracheal tube insertion site: oral  Blade: Medina  Blade size: 3  ETT size (mm): 7.5  Cormack-Lehane Classification: grade I - full view of glottis  Placement verified by: bronchoscopy   Measured from: lips  ETT/EBT  to lips (cm): 23  Number of attempts at approach: 1  Assessment: lips, teeth, and gum same as pre-op and atraumatic intubation

## 2024-03-14 NOTE — ANESTHESIA POSTPROCEDURE EVALUATION
Patient: Shaun Shanks    Procedure Summary       Date: 03/14/24 Room / Location: [facility] OR  / [facility] OR    Anesthesia Start: 1224 Anesthesia Stop: 1513    Procedure: LAPAROSCOPIC cholecystectomy converted to open subtotal cholecystectomy with repair of duodenal fistula with modified gram patch (Abdomen) Diagnosis:       Gallstones      (Gallstones [K80.20])    Surgeons: Antoine Ivey MD Provider: Kodi Delacruz MD    Anesthesia Type: general ASA Status: 3            Anesthesia Type: general    Vitals  Vitals Value Taken Time   /96 03/14/24 1535   Temp 96.9 °F (36.1 °C) 03/14/24 1518   Pulse 82 03/14/24 1536   Resp 18 03/14/24 1528   SpO2 98 % 03/14/24 1536   Vitals shown include unfiled device data.        Post Anesthesia Care and Evaluation    Patient location during evaluation: PACU  Patient participation: complete - patient participated  Level of consciousness: awake  Pain scale: See nurse's notes for pain score.  Pain management: adequate    Airway patency: patent  Anesthetic complications: No anesthetic complications  PONV Status: none  Cardiovascular status: acceptable  Respiratory status: acceptable and spontaneous ventilation  Hydration status: acceptable    Comments: Patient seen and examined postoperatively; vital signs stable; SpO2 greater than or equal to 90%; cardiopulmonary status stable; nausea/vomiting adequately controlled; pain adequately controlled; no apparent anesthesia complications; patient discharged from anesthesia care when discharge criteria were met

## 2024-03-14 NOTE — CONSULTS
History and Physical   Shaun Shanks : 1980 MRN:[id number] LOS:0     Reason for admission: Gallstones     Assessment / Plan     # Cholelithiasis status post open subtotal cholecystectomy  -Patient is admitted for elective surgery.  Patient has symptomatic cholelithiasis.  -Patient close surgery laparoscopic cholecystectomy converted to open subtotal cholecystectomy on .  -Patient is very anxious..  Blood pressure in the high side.  -pain med per surgery team   -zyprexa added   -now strict npo and so will need to resume PO home meds once clinically  appropriate    #Anxiety  -zyprexa as needed's ordered.    #COPD  -DuoNeb    #Diabetes mellitus  -  N.p.o. currently.  Started on SSI and once patient is able to eat    #Hypertension  -Hydralazine as needed is ordered.  Currently strict NPO.  Resume home medication once clinically appropriate          Nutrition: No diet orders on file     DVT Prophylaxis:   Mechanical Order History:        Ordered        Signed and Held  Place Sequential Compression Device  Once            Signed and Held  Place Sequential Compression Device  Once            Signed and Held  Maintain Sequential Compression Device  Continuous                          Pharmalogical Order History:       None             History of Present illness     A 43 y.o. old male patient with PMH of anxiety COPD diabetes mellitus hypertension DVT presents to the hospital with complaints of elective surgery for cholecystectomy.  Patient is follow-up with surgeon for symptomatic cholelithiasis.  Got laparoscopic cholecystectomy converted to open subtotal cholecystectomy with repair of duodenal fistula and modified gram patch.  Hospitalist team is consulted for medical management.      Subjective / Review of systems     Review of Systems   Pain in surgery area     Past Medical/Surgical/Social/Family History & Allergies     Past Medical History:   Diagnosis Date    Abdominal pain     Anxiety     COPD  (chronic obstructive pulmonary disease)     Diabetes mellitus     Disease of thyroid gland     DVT (deep venous thrombosis)     Heart disease     Hepatitis C     Hypertension       Past Surgical History:   Procedure Laterality Date    KNEE ARTHROSCOPY Right     MULTIPLE TOOTH EXTRACTIONS      TONSILLECTOMY        Social History     Socioeconomic History    Marital status: Single   Tobacco Use    Smoking status: Every Day     Current packs/day: 0.75     Average packs/day: 0.8 packs/day for 16.0 years (12.0 ttl pk-yrs)     Types: Cigarettes     Passive exposure: Current    Smokeless tobacco: Never   Vaping Use    Vaping status: Some Days    Substances: Nicotine, Flavoring   Substance and Sexual Activity    Alcohol use: Yes     Comment: occasional    Drug use: Never    Sexual activity: Defer      Family History   Problem Relation Age of Onset    Heart disease Mother     Diabetes Father       No Known Allergies     Home Medications     Prior to Admission medications    Medication Sig Start Date End Date Taking? Authorizing Provider   albuterol sulfate  (90 Base) MCG/ACT inhaler Inhale 2 puffs Every 4 (Four) Hours As Needed.   Yes Mitchell Shah MD Breztri Aerosphere 160-9-4.8 MCG/ACT aerosol inhaler Inhale 2 puffs 2 (Two) Times a Day. 11/28/22  Yes Mitchell Shah MD   buPROPion XL (WELLBUTRIN XL) 150 MG 24 hr tablet Take 1 tablet by mouth Every Morning. 1/18/24  Yes Mitchell Shah MD   DULoxetine (CYMBALTA) 60 MG capsule Take 1 capsule by mouth 2 (Two) Times a Day.   Yes Mitchell Shah MD   glimepiride (AMARYL) 4 MG tablet Take 1 tablet by mouth 2 (Two) Times a Day With Meals. 11/14/22  Yes Mitchell Shah MD   levothyroxine (SYNTHROID, LEVOTHROID) 50 MCG tablet Take 1 tablet by mouth Daily. 11/22/22  Yes Mitchell Shah MD   losartan-hydrochlorothiazide (HYZAAR) 100-25 MG per tablet Take 1 tablet by mouth Daily. 11/28/22  Yes Mitchell Shah MD   metFORMIN ER  "(GLUCOPHAGE-XR) 500 MG 24 hr tablet Take 2 tablets by mouth Daily With Breakfast.   Yes ProviderMitchell MD   montelukast (SINGULAIR) 10 MG tablet Take 1 tablet by mouth Every Night.   Yes Mitchell Shah MD   ondansetron ODT (ZOFRAN-ODT) 8 MG disintegrating tablet ondansetron 8 mg disintegrating tablet   DISSOLVE ONE TABLET UNDER THE TONGUE EVERY 12 HOURS AS NEEDED FOR NAUSEA   Yes Mitchell Shah MD   pregabalin (LYRICA) 50 MG capsule Take 1 capsule by mouth 2 (Two) Times a Day.   Yes ProviderMitchell MD   Sofosbuvir-Velpatasvir 400-100 MG tablet Take 1 tablet by mouth Daily.   Yes Mitchell Shah MD      Objective / Physical Exam   Vital signs:  Temp: 96.9 °F (36.1 °C)  BP: 173/86  Heart Rate: 63  Resp: 12  SpO2: 93 %  Weight: 128 kg (283 lb)    Admission Weight: Weight: 124 kg (274 lb)    Physical Exam   Physical Exam  HENT:      Head: Normocephalic and atraumatic.      Nose: Nose normal.   Eyes:      Extraocular Movements: Extraocular movements intact.      Conjunctivae/sclera: Conjunctivae normal.      Pupils: Pupils are equal, round, and reactive to light.   Cardiovascular:      Rate and Rhythm: normal       Pulses: Normal pulses.      Heart sounds: Normal heart sounds.   Pulmonary:      Effort: normal      Breath sounds: normal   Abdominal:      Surgical site with clean dressing    Musculoskeletal:         General: Normal range of motion.      Cervical back: Normal range of motion and neck supple.   Skin:     General: Skin is dry.   Neurological:      General: No focal deficit present.      Mental Status: alert.   Psychiatric:         Mood and Affect: anxious        Labs         Invalid input(s): \"HBG\"          Current Medications   Scheduled Meds:chlorhexidine, 15 mL, Mouth/Throat, Q12H  mupirocin, , Nasal, BID         Continuous Infusions:lactated ringers, 9 mL/hr, Last Rate: Stopped (03/14/24 1523)  sodium chloride, 100 mL/hr         Greg Nevarez MD  [facility]   03/14/24 "   17:11 EDT

## 2024-03-14 NOTE — ANESTHESIA PREPROCEDURE EVALUATION
Anesthesia Evaluation     Patient summary reviewed and Nursing notes reviewed   no history of anesthetic complications:   NPO Solid Status: > 8 hours  NPO Liquid Status: > 2 hours           Airway   Dental      Pulmonary    (+) a smoker Current, COPD,  Cardiovascular     (+) hypertension, DVT      Neuro/Psych  (+) psychiatric history Anxiety  GI/Hepatic/Renal/Endo    (+) obesity, hepatitis C, liver disease, diabetes mellitus, thyroid problem hypothyroidism    Musculoskeletal     Abdominal    Substance History      OB/GYN          Other        ROS/Med Hx Other: Additional History:  Cholelithiasis, abd pain, allergies, heart dz    PSH:  TONSILLECTOMY KNEE ARTHROSCOPY  MULTIPLE TOOTH EXTRACTIONS               Anesthesia Plan    ASA 3     general     (Patient identified; pre-operative vital signs, all relevant labs/studies, complete medical/surgical/anesthetic history, full medication list, full allergy list, and NPO status obtained/reviewed; physical assessment performed; anesthetic options, side effects, potential complications, risks, and benefits discussed; questions answered; written anesthesia consent obtained; patient cleared for procedure; anesthesia machine and equipment checked and functioning)  intravenous induction     Anesthetic plan, risks, benefits, and alternatives have been provided, discussed and informed consent has been obtained with: patient.    Plan discussed with CRNA and CAA.    CODE STATUS:

## 2024-03-14 NOTE — OP NOTE
CHOLECYSTECTOMY LAPAROSCOPIC  Operative Note    Patient Name:  Shaun Shanks  YOB: 1980    Date of Surgery:  3/14/2024     Indications: Patient is a 43-year-old gentleman who was referred to me as an outpatient for symptomatic cholelithiasis.  We discussed the risk, benefits, and alternatives to surgery, patient understands, and agrees to proceed to the operating room for laparoscopic, possible open cholecystectomy.    Pre-op Diagnosis:   Gallstones [K80.20]    Post-op Diagnosis:  Post-Op Diagnosis Codes:     * Gallstones [K80.20]    Procedure/CPT® Codes:      Procedure(s):  LAPAROSCOPIC cholecystectomy converted to open subtotal cholecystectomy with repair of duodenal fistula with modified gram patch    Staff:  Surgeon(s):  Domenic Ibarra MD McCormick, John Patrick, MD    Assistant: Destiny Shoemaker APRN was responsible for performing the following activities: Retraction, Suction, Closing, Placing Dressing, and Held/Positioned Camera and their skilled assistance was necessary for the success of this case.     Anesthesia: General    Estimated Blood Loss: 100ml    Implants:    Implant Name Type Inv. Item Serial No.  Lot No. LRB No. 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  In my opinion this was not a complication, but rather a pre-existing condition which was not known prior to surgical intervention    Description of Procedure: After obtaining informed consent in the preop holding area, the patient was brought the operating room placed in supine position.   SCDs were applied, preoperative antibiotics were ministered.  The patient then underwent uncomplicated duction of general endotracheal anesthesia.  The abdomen was then prepped and draped in the usual sterile fashion, and after a brief timeout the procedure began.  I began first by incising the area of skin just above the umbilicus and dissecting down to the umbilical stalk which was grasped and used to elevate the fascia below.  I incised the fascia using an 11 blade and then bluntly entered the abdomen.  Initial sweep of the abdomen revealed no evidence of bowel upon entry.  0 Vicryl stay sutures were placed on either side of the fascial openings and a 12 mm Boo trocar was introduced into the abdomen.  Patient was noted to have a significant amount of omentum draped over the top of his gallbladder which appeared micronodular and cirrhotic.  I then placed additional working trocars in the epigastrium and 2 additional 5 mm working trocars in the right upper quadrant.  I began by positioning the patient in a reverse Trendelenburg position with the right side elevated.  There were dense omental adhesions over the patient's gallbladder which were freed.  The gallbladder was sclerotic and firm and contracted underneath the micronodular liver.  The patient's duodenum was fused to the anterior surface of the gallbladder with no clear plane between the 2 structures.  I attempted to free this area using blunt dissection, and in doing so inadvertently placed a small hole in the dome of the gallbladder.  There was bile seen in this area which was suctioned free.  At this point, it was clear that the patient's gallbladder was not going to be able to be removed laparoscopically as visualization was impaired secondary to the patient's body habitus, his micronodular liver and the severe dense adhesions of the duodenum to the anterior surface of the gallbladder.  I then converted the patient to open by connecting my epigastric  and to 5 mm trocar incisions into 1 long subcostal incision in order to access the patient's right upper quadrant.  This was carried down through the subcutaneous fat using electrocautery to maintain hemostasis.  The anterior fascia leaflet was divided and the muscle layers were divided using electrocautery to maintain hemostasis.  The posterior fascia was divided as well opening the abdomen widely.  A Bookwalter trocar was then placed and we were able to visualize the right upper quadrant.  Using sharp dissection we were able to unroofed the anterior surface of the gallbladder wall which demonstrated a fistula from the gallbladder wall to the duodenum.  The duodenal fistula was then closed using interrupted 3-0 Vicryl suture.  The gallbladder was then visualized which appeared to be quite small.  I opened the gallbladder and suctioned free any gallstones, but there were not very many to be seen.  Given the contracted and sclerotic appearance of the gallbladder I did not attempt to remove this off of the gallbladder fossa.  I thought that there was a high likelihood of damaging the patient's bile ducts as well as causing bleeding from his micronodular liver.  I then secured the omentum over the duodenal repair using interrupted 3-0 Vicryl suture to perform a modified Reilly patch.  A 19 Maltese JOSETTE drain was placed between the duodenal repair and the gallbladder which was left open.  This was secured at the level of the skin using 2-0 nylon suture.  The abdomen was then irrigated with Irrisept and then closed first using #1 PDS suture to close the posterior fascial layer and running #1 PDS suture to close the anterior fascial layer.  The subcutaneous fat layer was then irrigated with copious amounts of warm normal saline and closed using skin staples.  The umbilical trocar site was then closed using the previously placed 0 Vicryl stay sutures and skin staples.  The wound was dressed with a fatou wound VAC, gauze  sponge, and drain sponge.  The patient tolerated the procedure well, was awoken, and taken to PACU in satisfactory condition.  He will remain n.p.o. with a nasogastric tube in place to low intermittent wall suction while we await for his duodenal fistula to repair.  Plan will be to perform a upper GI in 3 days time.  If no evidence of leak, can resume clear liquid diet.  Will keep his drain in place and look for evidence of bile leak.  If the patient does have a bile leak develop, may need to undergo ERCP, although this will be difficult in the setting of a recently repaired duodenal fistula putting him at high risk of recurrent leak.    Antoine Ivey MD     Date: 3/14/2024  Time: 16:26 EDT

## 2024-03-15 ENCOUNTER — APPOINTMENT (OUTPATIENT)
Dept: GENERAL RADIOLOGY | Facility: HOSPITAL | Age: 44
DRG: 330 | End: 2024-03-15
Payer: MEDICARE

## 2024-03-15 ENCOUNTER — INPATIENT HOSPITAL (OUTPATIENT)
Dept: URBAN - METROPOLITAN AREA HOSPITAL 84 | Facility: HOSPITAL | Age: 44
End: 2024-03-15
Payer: MEDICAID

## 2024-03-15 DIAGNOSIS — K80.50 CALCULUS OF BILE DUCT WITHOUT CHOLANGITIS OR CHOLECYSTITIS W: ICD-10-CM

## 2024-03-15 LAB
ALBUMIN SERPL-MCNC: 4.2 G/DL (ref 3.5–5.2)
ALBUMIN/GLOB SERPL: 1.2 G/DL
ALP SERPL-CCNC: 94 U/L (ref 39–117)
ALT SERPL W P-5'-P-CCNC: 51 U/L (ref 1–41)
ANION GAP SERPL CALCULATED.3IONS-SCNC: 11 MMOL/L (ref 5–15)
AST SERPL-CCNC: 43 U/L (ref 1–40)
BASOPHILS # BLD AUTO: 0.03 10*3/MM3 (ref 0–0.2)
BASOPHILS NFR BLD AUTO: 0.2 % (ref 0–1.5)
BILIRUB SERPL-MCNC: 0.8 MG/DL (ref 0–1.2)
BUN SERPL-MCNC: 16 MG/DL (ref 6–20)
BUN/CREAT SERPL: 15.4 (ref 7–25)
CALCIUM SPEC-SCNC: 9.1 MG/DL (ref 8.6–10.5)
CHLORIDE SERPL-SCNC: 101 MMOL/L (ref 98–107)
CO2 SERPL-SCNC: 24 MMOL/L (ref 22–29)
CREAT SERPL-MCNC: 1.04 MG/DL (ref 0.76–1.27)
DEPRECATED RDW RBC AUTO: 44.7 FL (ref 37–54)
EGFRCR SERPLBLD CKD-EPI 2021: 90.2 ML/MIN/1.73
EOSINOPHIL # BLD AUTO: 0 10*3/MM3 (ref 0–0.4)
EOSINOPHIL NFR BLD AUTO: 0 % (ref 0.3–6.2)
ERYTHROCYTE [DISTWIDTH] IN BLOOD BY AUTOMATED COUNT: 13.1 % (ref 12.3–15.4)
GLOBULIN UR ELPH-MCNC: 3.4 GM/DL
GLUCOSE BLDC GLUCOMTR-MCNC: 130 MG/DL (ref 70–105)
GLUCOSE BLDC GLUCOMTR-MCNC: 157 MG/DL (ref 70–105)
GLUCOSE BLDC GLUCOMTR-MCNC: 181 MG/DL (ref 70–105)
GLUCOSE SERPL-MCNC: 142 MG/DL (ref 65–99)
HCT VFR BLD AUTO: 43.5 % (ref 37.5–51)
HGB BLD-MCNC: 14.3 G/DL (ref 13–17.7)
IMM GRANULOCYTES # BLD AUTO: 0.08 10*3/MM3 (ref 0–0.05)
IMM GRANULOCYTES NFR BLD AUTO: 0.4 % (ref 0–0.5)
LYMPHOCYTES # BLD AUTO: 1.84 10*3/MM3 (ref 0.7–3.1)
LYMPHOCYTES NFR BLD AUTO: 10.2 % (ref 19.6–45.3)
MCH RBC QN AUTO: 30.9 PG (ref 26.6–33)
MCHC RBC AUTO-ENTMCNC: 32.9 G/DL (ref 31.5–35.7)
MCV RBC AUTO: 94 FL (ref 79–97)
MONOCYTES # BLD AUTO: 1.18 10*3/MM3 (ref 0.1–0.9)
MONOCYTES NFR BLD AUTO: 6.6 % (ref 5–12)
NEUTROPHILS NFR BLD AUTO: 14.87 10*3/MM3 (ref 1.7–7)
NEUTROPHILS NFR BLD AUTO: 82.6 % (ref 42.7–76)
NRBC BLD AUTO-RTO: 0 /100 WBC (ref 0–0.2)
PLATELET # BLD AUTO: 243 10*3/MM3 (ref 140–450)
PMV BLD AUTO: 10.7 FL (ref 6–12)
POTASSIUM SERPL-SCNC: 4.9 MMOL/L (ref 3.5–5.2)
PROCALCITONIN SERPL-MCNC: 0.66 NG/ML (ref 0–0.25)
PROT SERPL-MCNC: 7.6 G/DL (ref 6–8.5)
RBC # BLD AUTO: 4.63 10*6/MM3 (ref 4.14–5.8)
SODIUM SERPL-SCNC: 136 MMOL/L (ref 136–145)
WBC NRBC COR # BLD AUTO: 18 10*3/MM3 (ref 3.4–10.8)

## 2024-03-15 PROCEDURE — 85025 COMPLETE CBC W/AUTO DIFF WBC: CPT | Performed by: SURGERY

## 2024-03-15 PROCEDURE — 80053 COMPREHEN METABOLIC PANEL: CPT | Performed by: SURGERY

## 2024-03-15 PROCEDURE — 94761 N-INVAS EAR/PLS OXIMETRY MLT: CPT

## 2024-03-15 PROCEDURE — 99024 POSTOP FOLLOW-UP VISIT: CPT | Performed by: SURGERY

## 2024-03-15 PROCEDURE — 97161 PT EVAL LOW COMPLEX 20 MIN: CPT

## 2024-03-15 PROCEDURE — 25010000002 HYDROMORPHONE 1 MG/ML SOLUTION: Performed by: SURGERY

## 2024-03-15 PROCEDURE — 94799 UNLISTED PULMONARY SVC/PX: CPT

## 2024-03-15 PROCEDURE — 25810000003 SODIUM CHLORIDE 0.9 % SOLUTION: Performed by: SURGERY

## 2024-03-15 PROCEDURE — 84145 PROCALCITONIN (PCT): CPT | Performed by: HOSPITALIST

## 2024-03-15 PROCEDURE — 82948 REAGENT STRIP/BLOOD GLUCOSE: CPT

## 2024-03-15 PROCEDURE — 25010000002 PIPERACILLIN SOD-TAZOBACTAM PER 1 G: Performed by: SURGERY

## 2024-03-15 PROCEDURE — 99222 1ST HOSP IP/OBS MODERATE 55: CPT | Mod: FS | Performed by: NURSE PRACTITIONER

## 2024-03-15 PROCEDURE — 82948 REAGENT STRIP/BLOOD GLUCOSE: CPT | Performed by: NURSE PRACTITIONER

## 2024-03-15 PROCEDURE — 94664 DEMO&/EVAL PT USE INHALER: CPT

## 2024-03-15 PROCEDURE — 25010000002 OLANZAPINE 10 MG RECONSTITUTED SOLUTION 1 EACH VIAL: Performed by: INTERNAL MEDICINE

## 2024-03-15 PROCEDURE — 74018 RADEX ABDOMEN 1 VIEW: CPT

## 2024-03-15 PROCEDURE — 63710000001 INSULIN LISPRO (HUMAN) PER 5 UNITS: Performed by: NURSE PRACTITIONER

## 2024-03-15 RX ORDER — INSULIN LISPRO 100 [IU]/ML
2-9 INJECTION, SOLUTION INTRAVENOUS; SUBCUTANEOUS EVERY 6 HOURS SCHEDULED
Status: DISCONTINUED | OUTPATIENT
Start: 2024-03-15 | End: 2024-03-20 | Stop reason: HOSPADM

## 2024-03-15 RX ORDER — IBUPROFEN 600 MG/1
1 TABLET ORAL
Status: DISCONTINUED | OUTPATIENT
Start: 2024-03-15 | End: 2024-03-20 | Stop reason: HOSPADM

## 2024-03-15 RX ORDER — NICOTINE POLACRILEX 4 MG
15 LOZENGE BUCCAL
Status: DISCONTINUED | OUTPATIENT
Start: 2024-03-15 | End: 2024-03-20 | Stop reason: HOSPADM

## 2024-03-15 RX ORDER — MIDAZOLAM HYDROCHLORIDE 1 MG/ML
0.5 INJECTION INTRAMUSCULAR; INTRAVENOUS ONCE
Status: DISCONTINUED | OUTPATIENT
Start: 2024-03-15 | End: 2024-03-17

## 2024-03-15 RX ORDER — DEXTROSE MONOHYDRATE 25 G/50ML
25 INJECTION, SOLUTION INTRAVENOUS
Status: DISCONTINUED | OUTPATIENT
Start: 2024-03-15 | End: 2024-03-20 | Stop reason: HOSPADM

## 2024-03-15 RX ADMIN — OLANZAPINE 5 MG: 10 INJECTION, POWDER, FOR SOLUTION INTRAMUSCULAR at 01:53

## 2024-03-15 RX ADMIN — PIPERACILLIN AND TAZOBACTAM 4.5 G: 4; .5 INJECTION, POWDER, FOR SOLUTION INTRAVENOUS at 17:31

## 2024-03-15 RX ADMIN — IPRATROPIUM BROMIDE AND ALBUTEROL SULFATE 3 ML: .5; 3 SOLUTION RESPIRATORY (INHALATION) at 07:45

## 2024-03-15 RX ADMIN — IPRATROPIUM BROMIDE AND ALBUTEROL SULFATE 3 ML: .5; 3 SOLUTION RESPIRATORY (INHALATION) at 15:30

## 2024-03-15 RX ADMIN — SODIUM CHLORIDE 100 ML/HR: 9 INJECTION, SOLUTION INTRAVENOUS at 17:30

## 2024-03-15 RX ADMIN — HYDROMORPHONE HYDROCHLORIDE 0.5 MG: 1 INJECTION, SOLUTION INTRAMUSCULAR; INTRAVENOUS; SUBCUTANEOUS at 06:51

## 2024-03-15 RX ADMIN — PIPERACILLIN AND TAZOBACTAM 4.5 G: 4; .5 INJECTION, POWDER, FOR SOLUTION INTRAVENOUS at 01:16

## 2024-03-15 RX ADMIN — IPRATROPIUM BROMIDE AND ALBUTEROL SULFATE 3 ML: .5; 3 SOLUTION RESPIRATORY (INHALATION) at 11:53

## 2024-03-15 RX ADMIN — HYDROMORPHONE HYDROCHLORIDE 0.5 MG: 1 INJECTION, SOLUTION INTRAMUSCULAR; INTRAVENOUS; SUBCUTANEOUS at 12:16

## 2024-03-15 RX ADMIN — INSULIN LISPRO 2 UNITS: 100 INJECTION, SOLUTION INTRAVENOUS; SUBCUTANEOUS at 12:16

## 2024-03-15 RX ADMIN — HYDROMORPHONE HYDROCHLORIDE 0.5 MG: 1 INJECTION, SOLUTION INTRAMUSCULAR; INTRAVENOUS; SUBCUTANEOUS at 16:13

## 2024-03-15 RX ADMIN — HYDROMORPHONE HYDROCHLORIDE 0.5 MG: 1 INJECTION, SOLUTION INTRAMUSCULAR; INTRAVENOUS; SUBCUTANEOUS at 20:20

## 2024-03-15 RX ADMIN — PANTOPRAZOLE SODIUM 40 MG: 40 INJECTION, POWDER, FOR SOLUTION INTRAVENOUS at 20:20

## 2024-03-15 RX ADMIN — SODIUM CHLORIDE 100 ML/HR: 9 INJECTION, SOLUTION INTRAVENOUS at 06:51

## 2024-03-15 RX ADMIN — PIPERACILLIN AND TAZOBACTAM 4.5 G: 4; .5 INJECTION, POWDER, FOR SOLUTION INTRAVENOUS at 09:39

## 2024-03-15 RX ADMIN — HYDROMORPHONE HYDROCHLORIDE 0.5 MG: 1 INJECTION, SOLUTION INTRAMUSCULAR; INTRAVENOUS; SUBCUTANEOUS at 09:39

## 2024-03-15 RX ADMIN — HYDROMORPHONE HYDROCHLORIDE 0.5 MG: 1 INJECTION, SOLUTION INTRAMUSCULAR; INTRAVENOUS; SUBCUTANEOUS at 00:21

## 2024-03-15 RX ADMIN — INSULIN LISPRO 2 UNITS: 100 INJECTION, SOLUTION INTRAVENOUS; SUBCUTANEOUS at 07:41

## 2024-03-15 RX ADMIN — IPRATROPIUM BROMIDE AND ALBUTEROL SULFATE 3 ML: .5; 3 SOLUTION RESPIRATORY (INHALATION) at 20:04

## 2024-03-15 RX ADMIN — PANTOPRAZOLE SODIUM 40 MG: 40 INJECTION, POWDER, FOR SOLUTION INTRAVENOUS at 09:39

## 2024-03-15 NOTE — THERAPY EVALUATION
Patient Name: Shaun Shanks  : 1980    MRN: [id number]                              Today's Date: 3/15/2024       Admit Date: 3/14/2024    Visit Dx:     ICD-10-CM ICD-9-CM   1. Gallstones  K80.20 574.20     Patient Active Problem List   Diagnosis    Gallstones    Duodenal fistula    S/P cholecystectomy     Past Medical History:   Diagnosis Date    Abdominal pain     Anxiety     COPD (chronic obstructive pulmonary disease)     Diabetes mellitus     Disease of thyroid gland     DVT (deep venous thrombosis)     Heart disease     Hepatitis C     Hypertension      Past Surgical History:   Procedure Laterality Date    CHOLECYSTECTOMY N/A 3/14/2024    Procedure: LAPAROSCOPIC cholecystectomy converted to open subtotal cholecystectomy with repair of duodenal fistula with modified gram patch;  Surgeon: Antoine Ivey MD;  Location: [facility] MAIN OR;  Service: General;  Laterality: N/A;    KNEE ARTHROSCOPY Right     MULTIPLE TOOTH EXTRACTIONS      TONSILLECTOMY        General Information       Row Name 03/15/24 1522          Physical Therapy Time and Intention    Document Type evaluation  -CL     Mode of Treatment physical therapy  -CL       Row Name 03/15/24 1522          General Information    Patient Profile Reviewed yes  -CL     Prior Level of Function all household mobility;gait;driving;ADL's  -CL     Existing Precautions/Restrictions NPO  -CL     Barriers to Rehab none identified  -CL       Row Name 03/15/24 1522          Living Environment    People in Home parent(s)  -CL     Name(s) of People in Home Mother; Darlene  -CL       Row Name 03/15/24 1522          Home Main Entrance    Number of Stairs, Main Entrance other (see comments)  ramp  -CL       Row Name 03/15/24 1522          Stairs Within Home, Primary    Number of Stairs, Within Home, Primary none  -CL       Row Name 03/15/24 1522          Cognition    Orientation Status (Cognition) oriented x 4  -CL       Row Name 03/15/24 1522          Safety  Issues, Functional Mobility    Impairments Affecting Function (Mobility) pain  -CL               User Key  (r) = Recorded By, (t) = Taken By, (c) = Cosigned By      Initials Name Provider Type    Elinor Hills, PT Physical Therapist                   Mobility       Row Name 03/15/24 1531          Bed Mobility    Bed Mobility supine-sit-supine  -CL     Supine-Sit-Supine [place] (Bed Mobility) modified independence  -CL     Assistive Device (Bed Mobility) head of bed elevated;bed rails  -CL     Comment, (Bed Mobility) Educated pt on alternate bed mobility techniques to lessen discomfort  -CL       Row Name 03/15/24 1531          Bed-Chair Transfer    Bed-Chair [place] (Transfers) contact guard  -CL       Row Name 03/15/24 1531          Sit-Stand Transfer    Sit-Stand [place] (Transfers) contact guard  -CL       Row Name 03/15/24 1531          Gait/Stairs (Locomotion)    Gait/Stairs Locomotion gait/ambulation independence  -CL     [place] Level (Gait) contact guard  -CL     Assistive Device (Gait) other (see comments)  Pt holding onto IV pole for support  -CL     Patient was able to Ambulate yes  -CL     Distance in Feet (Gait) 40  -CL               User Key  (r) = Recorded By, (t) = Taken By, (c) = Cosigned By      Initials Name Provider Type    Elinor Hills, PT Physical Therapist                   Obj/Interventions       Row Name 03/15/24 1534          Range of Motion Comprehensive    General Range of Motion no range of motion deficits identified  -CL       Row Name 03/15/24 1534          Strength Comprehensive (MMT)    General Manual Muscle Testing (MMT) Assessment no strength deficits identified  -CL       Row Name 03/15/24 1534          Balance    Balance Assessment sitting static balance;sitting dynamic balance;standing static balance;standing dynamic balance  -CL     Dynamic Sitting Balance verbal cues;standby assist  -CL     Position, Sitting Balance sitting edge of bed  -CL      Static Standing Balance contact guard  -CL     Dynamic Standing Balance contact guard  -CL     Position/Device Used, Standing Balance supported;other (see comments)  -CL               User Key  (r) = Recorded By, (t) = Taken By, (c) = Cosigned By      Initials Name Provider Type    Elinor Hills, PT Physical Therapist                   Goals/Plan       Row Name 03/15/24 1541          Bed Mobility Goal 1 (PT)    Activity/Assistive Device (Bed Mobility Goal 1, PT) bed mobility activities, all  -CL     [place] Level/Cues Needed (Bed Mobility Goal 1, PT) independent  -CL     Time Frame (Bed Mobility Goal 1, PT) long term goal (LTG);2 weeks  -CL       Row Name 03/15/24 1541          Transfer Goal 1 (PT)    Activity/Assistive Device (Transfer Goal 1, PT) sit-to-stand/stand-to-sit;bed-to-chair/chair-to-bed  -CL     [place] Level/Cues Needed (Transfer Goal 1, PT) independent  -CL     Time Frame (Transfer Goal 1, PT) long term goal (LTG);2 weeks  -CL       Row Name 03/15/24 1541          Gait Training Goal 1 (PT)    Activity/Assistive Device (Gait Training Goal 1, PT) gait (walking locomotion)  -CL     [place] Level (Gait Training Goal 1, PT) independent  -CL     Distance (Gait Training Goal 1, PT) 200'  -CL     Time Frame (Gait Training Goal 1, PT) long term goal (LTG);2 weeks  -CL       Row Name 03/15/24 1541          Therapy Assessment/Plan (PT)    Planned Therapy Interventions (PT) balance training;bed mobility training;gait training;patient/family education;transfer training  -CL               User Key  (r) = Recorded By, (t) = Taken By, (c) = Cosigned By      Initials Name Provider Type    Elinor Hills, PT Physical Therapist                   Clinical Impression       Row Name 03/15/24 1536          Pain    Pretreatment Pain Rating 5/10  -CL     Posttreatment Pain Rating 5/10  -CL     Pain Location incisional  -CL     Pain Intervention(s) Repositioned;Ambulation/increased activity   -CL       Row Name 03/15/24 1536          Plan of Care Review    Plan of Care Reviewed With patient  -CL     Outcome Evaluation Shaun Shanks is a 43 y.o. male with hx of Anxiety, COPD, DM, DVT, Hep C and HTN who presents with  gall stones and underwent LAPAROSCOPIC cholecystectomy converted to open subtotal cholecystectomy with repair of duodenal fistula with modified gram patch by Dr. Ivey on 3/14/24 and is now seen by PT on POD #1.  Pt lives with mom in a SSH with ramp to enter. Pt is typically independent with mobility, transfers and ADLs without AD.  He reports no hx of falls.  Pt is an active  and does not work.  At time of PT evaluation, he is A&O x 4 with moderate abdominal pain.  Pt performs bed mobility modified independently with HOB elevated and use of bed rail. He walks 40' with CG-SBA holding onto IV pole for additional support.  We will continue to work with him while here but he should not need ongoing therapy at discharge.  -CL       Row Name 03/15/24 1536          Therapy Assessment/Plan (PT)    Rehab Potential (PT) good, to achieve stated therapy goals  -CL     Criteria for Skilled Interventions Met (PT) yes;skilled treatment is necessary  -CL     Therapy Frequency (PT) 3 times/wk  -CL     Predicted Duration of Therapy Intervention (PT) Until discharge  -CL       Row Name 03/15/24 1536          Vital Signs    Pre SpO2 (%) 94  -CL     O2 Delivery Pre Treatment room air  -CL       Row Name 03/15/24 1536          Positioning and Restraints    Pre-Treatment Position in bed  -CL     Post Treatment Position bed  -CL     In Bed notified nsg;supine;call light within reach  -CL               User Key  (r) = Recorded By, (t) = Taken By, (c) = Cosigned By      Initials Name Provider Type    CL Elinor Clarke, PT Physical Therapist                   Outcome Measures       Row Name 03/15/24 1541 03/15/24 0933       How much help from another person do you currently need...    Turning from your  back to your side while in flat bed without using bedrails? 4  -CL 4  -IH (r)  CS (c)    Moving from lying on back to sitting on the side of a flat bed without bedrails? 4  -CL 4  -IH (r)  CS (c)    Moving to and from a bed to a chair (including a wheelchair)? 3  -CL 3  -IH (r)  CS (c)    Standing up from a chair using your arms (e.g., wheelchair, bedside chair)? 3  -CL 3  -IH (r)  CS (c)    Climbing 3-5 steps with a railing? 3  -CL 3  -IH (r)  CS (c)    To walk in hospital room? 3  -CL 3  -IH (r)  CS (c)    AM-PAC 6 Clicks Score (PT) 20  -CL 20  -IH    Highest Level of Mobility Goal 6 --> Walk 10 steps or more  -CL 6 --> Walk 10 steps or more  -      Row Name 03/15/24 0845          How much help from another person do you currently need...    Turning from your back to your side while in flat bed without using bedrails? 4  -IH (r)  CS (c)     Moving from lying on back to sitting on the side of a flat bed without bedrails? 4  -IH (r)  CS (c)     Moving to and from a bed to a chair (including a wheelchair)? 3  -IH (r)  CS (c)     Standing up from a chair using your arms (e.g., wheelchair, bedside chair)? 3  -IH (r)  CS (c)     Climbing 3-5 steps with a railing? 3  -IH (r)  CS (c)     To walk in hospital room? 3  -IH (r)  CS (c)     AM-PAC 6 Clicks Score (PT) 20  -IH     Highest Level of Mobility Goal 6 --> Walk 10 steps or more  -IH               User Key  (r) = Recorded By, (t) = Taken By, (c) = Cosigned By      Initials Name Provider Type    Sarah Allen, RN Registered Nurse    IH Toshia Edmonds RNA Registered Nurse    CL Elinor Clarke, PT Physical Therapist                                 Physical Therapy Education       Title: PT OT SLP Therapies (Done)       Topic: Physical Therapy (Done)       Point: Mobility training (Done)       Learning Progress Summary             Patient Acceptance, E,TB, VU by CL at 3/15/2024 1542                         Point: Body mechanics (Done)       Learning Progress  Summary             Patient Acceptance, E,TB, VU by  at 3/15/2024 1542                                         User Key       Initials Effective Dates Name Provider Type Discipline     03/02/22 -  Elinor Clarke, PT Physical Therapist PT                  PT Recommendation and Plan  Planned Therapy Interventions (PT): balance training, bed mobility training, gait training, patient/family education, transfer training  Plan of Care Reviewed With: patient  Outcome Evaluation: Shaun Shanks is a 43 y.o. male with hx of Anxiety, COPD, DM, DVT, Hep C and HTN who presents with  gall stones and underwent LAPAROSCOPIC cholecystectomy converted to open subtotal cholecystectomy with repair of duodenal fistula with modified gram patch by Dr. Ivey on 3/14/24 and is now seen by PT on POD #1.  Pt lives with mom in a SSH with ramp to enter. Pt is typically independent with mobility, transfers and ADLs without AD.  He reports no hx of falls.  Pt is an active  and does not work.  At time of PT evaluation, he is A&O x 4 with moderate abdominal pain.  Pt performs bed mobility modified independently with HOB elevated and use of bed rail. He walks 40' with CG-SBA holding onto IV pole for additional support.  We will continue to work with him while here but he should not need ongoing therapy at discharge.     Time Calculation:   PT Evaluation Complexity  History, PT Evaluation Complexity: 3 or more personal factors and/or comorbidities  Examination of Body Systems (PT Eval Complexity): total of 3 or more elements  Clinical Presentation (PT Evaluation Complexity): evolving  Clinical Decision Making (PT Evaluation Complexity): low complexity  Overall Complexity (PT Evaluation Complexity): low complexity     PT Charges       Row Name 03/15/24 1542             Time Calculation    Start Time 1422  -CL      Stop Time 1434  -CL      Time Calculation (min) 12 min  -CL      PT Received On 03/15/24  -CL      PT - Next Appointment  03/17/24  -CL      PT Goal Re-Cert Due Date 03/29/24  -CL         Time Calculation- PT    Total Timed Code Minutes- PT 0 minute(s)  -CL                User Key  (r) = Recorded By, (t) = Taken By, (c) = Cosigned By      Initials Name Provider Type    Elinor Hills, PT Physical Therapist                  Therapy Charges for Today       Code Description Service Date Service Provider Modifiers Qty    [id number] HC PT EVAL LOW COMPLEXITY 3 3/15/2024 Elinor Clarke, PT GP 1            PT G-Codes  AM-PAC 6 Clicks Score (PT): 20  PT Discharge Summary  Anticipated Discharge Disposition (PT): home    Elinor Clarke, PT  3/15/2024

## 2024-03-15 NOTE — PLAN OF CARE
Goal Outcome Evaluation:      Pt alert and oriented. Complaints of pain managed with IV medication. Pt anxiety treated with PRN IM medication. VS stable on 2L O2 per nasal cannula. Plan of care ongoing.

## 2024-03-15 NOTE — CONSULTS
GI CONSULT  NOTE:    Referring Provider:     Dr Ivey     Chief complaint:    Duodenal fistula, subtotal cholecystectomy    Subjective   Elective cholecystectomy     History of present illness:     Patient is a 43-year-old male with a history of diabetes, anxiety, chronic hepatitis C currently on Vosevi, hepatitis B core antibody positive, COPD, DVT, hypertension who came in for elective cholecystectomy on 3/14/2024 and ended up being converted to an open subtotal colectomy with duodenal fistula repair with Reilly patch.  GI was consulted for duodenal fistula and subtotal cholecystectomy, questioning need for ERCP.  Patient states he has been sick since 2020 and was hospitalized at [facility] for several days in the ICU when he was originally diagnosed with gallstones.  At that time he also had coffee-ground emesis and was diagnosed with an ulcer in his stomach via EGD.  Then COVID hit and was only to do virtual visits.  During this time he has continued to have abdominal pain nausea and vomiting.  His mother answers all the questions.  She denies any constipation, diarrhea.  Takes Tums occasionally for reflux no PPI.  No NSAIDs.      Labs: Creatinine 1.04.  TB 0.8, AST 43, ALT 51, alk phos 94.  WBCs 18, hemoglobin 14.3, platelets 243.    Endo History:  None      Past Medical History:  Past Medical History:   Diagnosis Date    Abdominal pain     Anxiety     COPD (chronic obstructive pulmonary disease)     Diabetes mellitus     Disease of thyroid gland     DVT (deep venous thrombosis)     Heart disease     Hepatitis C     Hypertension        Past Surgical History:  Past Surgical History:   Procedure Laterality Date    KNEE ARTHROSCOPY Right     MULTIPLE TOOTH EXTRACTIONS      TONSILLECTOMY         Social History:  Social History     Tobacco Use    Smoking status: Every Day     Current packs/day: 0.75     Average packs/day: 0.8 packs/day for 16.0 years (12.0 ttl pk-yrs)     Types: Cigarettes     Passive  exposure: Current    Smokeless tobacco: Never   Vaping Use    Vaping status: Some Days    Substances: Nicotine, Flavoring   Substance Use Topics    Alcohol use: Yes     Comment: occasional    Drug use: Never       Family History:  Family History   Problem Relation Age of Onset    Heart disease Mother     Diabetes Father        Medications:  Medications Prior to Admission   Medication Sig Dispense Refill Last Dose    albuterol sulfate  (90 Base) MCG/ACT inhaler Inhale 2 puffs Every 4 (Four) Hours As Needed.   3/14/2024    Breztri Aerosphere 160-9-4.8 MCG/ACT aerosol inhaler Inhale 2 puffs 2 (Two) Times a Day.   3/14/2024    buPROPion XL (WELLBUTRIN XL) 150 MG 24 hr tablet Take 1 tablet by mouth Every Morning.   3/13/2024    DULoxetine (CYMBALTA) 60 MG capsule Take 1 capsule by mouth 2 (Two) Times a Day.   3/13/2024    glimepiride (AMARYL) 4 MG tablet Take 1 tablet by mouth 2 (Two) Times a Day With Meals.   3/13/2024    levothyroxine (SYNTHROID, LEVOTHROID) 50 MCG tablet Take 1 tablet by mouth Daily.   3/13/2024    losartan-hydrochlorothiazide (HYZAAR) 100-25 MG per tablet Take 1 tablet by mouth Daily.   3/13/2024    metFORMIN ER (GLUCOPHAGE-XR) 500 MG 24 hr tablet Take 2 tablets by mouth Daily With Breakfast.   3/12/2024    montelukast (SINGULAIR) 10 MG tablet Take 1 tablet by mouth Every Night.   3/13/2024    ondansetron ODT (ZOFRAN-ODT) 8 MG disintegrating tablet ondansetron 8 mg disintegrating tablet   DISSOLVE ONE TABLET UNDER THE TONGUE EVERY 12 HOURS AS NEEDED FOR NAUSEA       pregabalin (LYRICA) 50 MG capsule Take 1 capsule by mouth 2 (Two) Times a Day.   3/13/2024    Sofosbuvir-Velpatasvir 400-100 MG tablet Take 1 tablet by mouth Daily.   3/13/2024       Scheduled Meds:insulin lispro, 2-9 Units, Subcutaneous, Q6H  ipratropium-albuterol, 3 mL, Nebulization, 4x Daily - RT  pantoprazole, 40 mg, Intravenous, Q12H  piperacillin-tazobactam, 4.5 g, Intravenous, Q8H      Continuous Infusions:Pharmacy to Dose  Zosyn,   sodium chloride, 100 mL/hr, Last Rate: 100 mL/hr (03/15/24 0651)      PRN Meds:.  albuterol sulfate HFA    dextrose    dextrose    glucagon (human recombinant)    hydrALAZINE    HYDROmorphone **AND** naloxone    Morphine **AND** naloxone    OLANZapine (zyPREXA) 5 mg in sterile water (preservative free) 1 mL injection    ondansetron ODT **OR** ondansetron    Pharmacy to Dose Zosyn    ALLERGIES:  Patient has no known allergies.    ROS:  Review of Systems   Gastrointestinal:  Positive for abdominal pain, nausea and vomiting. Negative for constipation and diarrhea.     The following systems were reviewed and negative;    Constitution:  No fevers, chills, no unintentional weight loss  Skin: no rash, no jaundice  Eyes:  No blurry vision, no eye pain  HENT:  No change in hearing or smell  Resp:  No dyspnea or cough  CV:  No chest pain or palpitations  :  No dysuria, hematuria  Musculoskeletal:  No leg cramps or arthralgias  Neuro:  No tremor, no numbness  Psych:  No depression or confusion    Objective  Tremors noted. Mom at bedside & answers questions. NAD Rm 4103    Vital Signs:   Vitals:    03/14/24 2018 03/14/24 2035 03/15/24 0053 03/15/24 0438   BP:  153/89 174/96 149/83   BP Location:  Right arm Right arm Right arm   Patient Position:  Lying Lying Lying   Pulse:  91     Resp: 18 20 19 20   Temp:  98 °F (36.7 °C) 97.7 °F (36.5 °C) 98.5 °F (36.9 °C)   TempSrc:  Oral Oral Oral   SpO2:  95%     Weight:       Height:           Physical Exam:    General Appearance:    Awake and alert, in no acute distress   Head:    Normocephalic, without obvious abnormality, atraumatic   Eyes:            Conjunctivae normal, anicteric sclerae, pupils equal   Ears:    Ears appear intact with no abnormalities noted   Throat:   No oral lesions, no thrush, oral mucosa moist   Neck:   Supple, no JVD   Lungs:       respirations regular, even and unlabored        Chest Wall:    No abnormalities observed   Abdomen:      soft, upper  "abdomen tender, no rebound or guarding, nondistended, no hepatosplenomegaly.  JOSETTE drain with small amount of serosanguineous drainage.   Rectal:     Deferred   Extremities:   Moves all extremities, no edema, no cyanosis   Pulses:   Pulses palpable and equal bilaterally   Skin:   No rash, no jaundice, normal palpation        Neurologic:   Cranial nerves 2 - 12 grossly intact, no asterixis       Results Review:   I reviewed the patient's labs and imaging.  CBC    Results from last 7 days   Lab Units 03/15/24  0234   WBC 10*3/mm3 18.00*   HEMOGLOBIN g/dL 14.3   PLATELETS 10*3/mm3 243     CMP   Results from last 7 days   Lab Units 03/15/24  0234   SODIUM mmol/L 136   POTASSIUM mmol/L 4.9   CHLORIDE mmol/L 101   CO2 mmol/L 24.0   BUN mg/dL 16   CREATININE mg/dL 1.04   GLUCOSE mg/dL 142*   ALBUMIN g/dL 4.2   BILIRUBIN mg/dL 0.8   ALK PHOS U/L 94   AST (SGOT) U/L 43*   ALT (SGPT) U/L 51*     Cr Clearance Estimated Creatinine Clearance: 125.7 mL/min (by C-G formula based on SCr of 1.04 mg/dL).  Coag     HbA1C No results found for: \"HGBA1C\"  Blood Glucose   Glucose   Date/Time Value Ref Range Status   03/15/2024 0718 181 (H) 70 - 105 mg/dL Final     Comment:     Serial Number: [id number]:  [id number]   03/14/2024 2033 175 (H) 70 - 105 mg/dL Final     Comment:     Serial Number: [id number]:  [id number]   03/14/2024 1522 210 (H) 70 - 105 mg/dL Final     Comment:     Serial Number: [id number]:  [id number]   03/14/2024 0924 138 (H) 70 - 105 mg/dL Final     Comment:     Serial Number: [id number]:  [id number]     Infection     UA      Radiology(recent) No radiology results for the last day      ASSESSMENT:  Patient is a 43-year-old male who came in for an elective cholecystectomy due to gallstones and ended up having an open subtotal cholecystectomy.  Had a duodenal fistula which was repaired with a Reilly patch all on 3/14/2024.  GI was consulted for possible ERCP.    Gallstones status post subtotal " cholecystectomy 3/14/2024  Duodenal fistula with Reilly patch repair 3/14/2024  Chronic hepatitis C previously treated and cured with Epclusa now on Vosevi  Hepatitis B core antibody positive  COPD  History of DVT  Diabetes  Anxiety  Hypertension  Elevated LFTs consider related to hepatitis as well as gallstones  Leukocytosis    PLAN:  Do not recommend ERCP at this time due to her fresh surgery.  Would recommend monitoring JOSETTE drain output.  Could consider at some point.  Diet per general surgery.  Patient is on Zosyn which we agree with.  Will place on PPI.  Continue hepatitis C treatment  Supportive care in the interim.      I discussed the patient's findings and my recommendations with the patient.  Trixie Crawford, APRN  03/15/24  07:43 EDT    Time:

## 2024-03-15 NOTE — PROGRESS NOTES
[facility] MEDICINE SERVICE  DAILY PROGRESS NOTE    NAME: Shaun Shanks  : 1980  MRN: [id number]      LOS: 1 day     PROVIDER OF SERVICE: Shira Cadena MD    Chief Complaint: Gallstones    Subjective:     Interval History: Patient seen and examined, seems somewhat anxious, Versed IV ordered.  He is n.p.o. currently.    Review of Systems:   Review of Systems  10 point ROS is negative other than what is stated positive above  Objective:     Vital Signs  Temp:  [96.9 °F (36.1 °C)-98.5 °F (36.9 °C)] 97.6 °F (36.4 °C)  Heart Rate:  [] 79  Resp:  [12-22] 16  BP: (121-217)/() 121/87  Flow (L/min):  [0-10] 2   Body mass index is 37.34 kg/m².    Physical Exam  Physical Exam  Awake alert obese.  Seems anxious, NG tube present  HEENT normocephalic atraumatic  Chest clear to auscultation bilaterally  CVs S1-S2 normal, regular in rhythm  Abdomen soft no tenderness.  JOSETTE drain present.  Site looks clean.  Bowel sounds hypoactive  Extremities warm to touch pulses no edema    Scheduled Meds   insulin lispro, 2-9 Units, Subcutaneous, Q6H  ipratropium-albuterol, 3 mL, Nebulization, 4x Daily - RT  pantoprazole, 40 mg, Intravenous, Q12H  piperacillin-tazobactam, 4.5 g, Intravenous, Q8H       PRN Meds     albuterol sulfate HFA    dextrose    dextrose    glucagon (human recombinant)    hydrALAZINE    HYDROmorphone **AND** naloxone    Morphine **AND** naloxone    OLANZapine (zyPREXA) 5 mg in sterile water (preservative free) 1 mL injection    ondansetron ODT **OR** ondansetron    Pharmacy to Dose Zosyn   Infusions  Pharmacy to Dose Zosyn,   sodium chloride, 100 mL/hr, Last Rate: 100 mL/hr (03/15/24 0651)          Diagnostic Data    Results from last 7 days   Lab Units 03/15/24  0234   WBC 10*3/mm3 18.00*   HEMOGLOBIN g/dL 14.3   HEMATOCRIT % 43.5   PLATELETS 10*3/mm3 243   GLUCOSE mg/dL 142*   CREATININE mg/dL 1.04   BUN mg/dL 16   SODIUM mmol/L 136   POTASSIUM mmol/L 4.9   AST (SGOT) U/L 43*   ALT (SGPT) U/L  51*   ALK PHOS U/L 94   BILIRUBIN mg/dL 0.8   ANION GAP mmol/L 11.0       No radiology results for the last day      I reviewed the patient's new clinical results.    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     # Cholelithiasis status post open subtotal cholecystectomy  -Patient is admitted for elective surgery.  Patient has symptomatic cholelithiasis.  -Patient close surgery laparoscopic cholecystectomy converted to open subtotal cholecystectomy on March 14.  -Management per general surgery-currently strict n.p.o. and continue IV fluids.  Patient has NG tube connected to low intermittent wall suction.  -As needed pain meds and as needed nausea meds.  -Patient has a JOSETTE drain, plan to strip and empty every shift and as needed.    -Continue IV antibiotics with IV Zosyn-however white count worsening.  Continue to monitor for now.  Check procalcitonin.  -Apparently GI was also consulted for possible ERCP however did not recommend ERCP at this time secondary to fresh surgery.  Recommend continuing to monitor JOSETTE drain output.     #Anxiety  -zyprexa as needed's ordered.     #COPD  -DuoNeb     #Diabetes mellitus  -  N.p.o. currently.  Started on SSI and once patient is able to eat     #Hypertension  -Hydralazine as needed is ordered.  Currently strict NPO.  Resume home medication once clinically appropriate    Hep C   Needs treatment per GI.              Nutrition: No diet orders on file            DVT prophylaxis:  Mechanical DVT prophylaxis orders are present.         Code status is   Code Status and Medical Interventions:   Ordered at: 03/14/24 5740     Code Status (Patient has no pulse and is not breathing):    CPR (Attempt to Resuscitate)     Medical Interventions (Patient has pulse or is  breathing):    Full       Plan for disposition: Pending clinical course    Time: 30 minutes    Signature: Electronically signed by Shira Cadena MD, 03/15/24, 11:54 EDT.  [facility] Hospitalist Team

## 2024-03-15 NOTE — PLAN OF CARE
Goal Outcome Evaluation:  Plan of Care Reviewed With: patient           Outcome Evaluation: Shaun Shanks is a 43 y.o. male with hx of Anxiety, COPD, DM, DVT, Hep C and HTN who presents with  gall stones and underwent LAPAROSCOPIC cholecystectomy converted to open subtotal cholecystectomy with repair of duodenal fistula with modified gram patch by Dr. Ivey on 3/14/24 and is now seen by PT on POD #1.  Pt lives with mom in a SSH with ramp to enter. Pt is typically independent with mobility, transfers and ADLs without AD.  He reports no hx of falls.  Pt is an active  and does not work.  At time of PT evaluation, he is A&O x 4 with moderate abdominal pain.  Pt performs bed mobility modified independently with HOB elevated and use of bed rail. He walks 40' with CG-SBA holding onto IV pole for additional support.  We will continue to work with him while here but he should not need ongoing therapy at discharge.      Anticipated Discharge Disposition (PT): home

## 2024-03-15 NOTE — PROGRESS NOTES
"General Surgery Progress Note    Name: Shaun Shanks ADMIT: 3/14/2024   : 1980  PCP: Diann Wallace MD    MRN: [id number] LOS: 1 days   AGE/SEX: 43 y.o. male  ROOM: [address] [place] [address]    No chief complaint on file.    Subjective     Patient seen and examined.  Vital signs stable, afebrile.  Doing okay this morning, no new complaints.  States pain is controlled.  Currently n.p.o. with NG tube in place, denies nausea or vomiting.  Not passing flatus, no BM.  Bump in white count, likely reactive.  Hemoglobin appears stable.     Objective     Scheduled Medications:   insulin lispro, 2-9 Units, Subcutaneous, Q6H  ipratropium-albuterol, 3 mL, Nebulization, 4x Daily - RT  pantoprazole, 40 mg, Intravenous, Q12H  piperacillin-tazobactam, 4.5 g, Intravenous, Q8H        Active Infusions:  Pharmacy to Dose Zosyn,   sodium chloride, 100 mL/hr, Last Rate: 100 mL/hr (03/15/24 0651)        As Needed Medications:    albuterol sulfate HFA    dextrose    dextrose    glucagon (human recombinant)    hydrALAZINE    HYDROmorphone **AND** naloxone    Morphine **AND** naloxone    OLANZapine (zyPREXA) 5 mg in sterile water (preservative free) 1 mL injection    ondansetron ODT **OR** ondansetron    Pharmacy to Dose Zosyn    Vital Signs  Vital Signs Patient Vitals for the past 24 hrs:   BP Temp Temp src Pulse Resp SpO2 Height Weight   03/15/24 0836 121/87 97.6 °F (36.4 °C) Oral 79 16 94 % -- --   03/15/24 0752 -- -- -- 67 18 98 % -- --   03/15/24 0745 -- -- -- 72 18 95 % -- --   03/15/24 0438 149/83 98.5 °F (36.9 °C) Oral -- 20 -- -- --   03/15/24 0053 174/96 97.7 °F (36.5 °C) Oral -- 19 -- -- --   245 153/89 98 °F (36.7 °C) Oral 91 20 95 % -- --   24 -- -- -- -- 18 -- -- --   24 -- -- -- 79 18 95 % -- --   24 1840 (!) 183/103 -- -- -- -- -- -- --   24 1831 (!) 187/111 97.3 °F (36.3 °C) Oral 77 20 94 % 185.4 cm (73\") 128 kg (283 lb)   24 1815 166/82 98 °F (36.7 °C) Oral 67 14 95 % -- -- "   03/14/24 1800 (!) 217/100 -- -- 64 14 95 % -- --   03/14/24 1745 (!) 196/104 98.1 °F (36.7 °C) Oral 70 17 96 % -- --   03/14/24 1730 (!) 210/110 -- -- 80 12 98 % -- --   03/14/24 1713 (!) 182/91 -- -- 69 12 92 % -- --   03/14/24 1658 179/85 -- -- 67 15 92 % -- --   03/14/24 1643 173/86 -- -- 63 12 93 % -- --   03/14/24 1628 148/100 -- -- 70 13 93 % -- --   03/14/24 1613 180/88 -- -- 95 15 92 % -- --   03/14/24 1558 (!) 190/113 -- -- 89 18 94 % -- --   03/14/24 1548 -- -- -- 94 18 100 % -- --   03/14/24 1545 -- -- -- 100 18 94 % -- --   03/14/24 1543 158/97 -- -- 82 15 98 % -- --   03/14/24 1528 (!) 212/122 -- -- 84 18 97 % -- --   03/14/24 1523 (!) 212/115 -- -- 83 19 93 % -- --   03/14/24 1518 (!) 208/103 96.9 °F (36.1 °C) Axillary 95 18 95 % -- --   03/14/24 1513 (!) 202/107 96.9 °F (36.1 °C) Axillary 98 22 95 % -- --   03/14/24 0955 143/87 98.4 °F (36.9 °C) -- -- 16 95 % -- 128 kg (283 lb)     I/O:  I/O last 3 completed shifts:  In: 2300 [I.V.:2200; IV Piggyback:100]  Out: 1310 [Urine:975; Drains:235; Blood:100]    Physical Exam:  Physical Exam  Constitutional:       General: He is not in acute distress.  Cardiovascular:      Rate and Rhythm: Normal rate.   Pulmonary:      Effort: Pulmonary effort is normal. No respiratory distress.   Abdominal:      General: There is no distension.      Palpations: Abdomen is soft.      Tenderness: There is abdominal tenderness. There is no guarding.      Comments: JOSETTE x 1 with serosanguineous output in bulb, 235 mL recorded x 24 hours.  NG tube in place, no output recorded.   Neurological:      Mental Status: He is alert and oriented to person, place, and time.   Psychiatric:         Mood and Affect: Mood normal.         Behavior: Behavior normal.         Results Review:     CBC    Results from last 7 days   Lab Units 03/15/24  0234   WBC 10*3/mm3 18.00*   HEMOGLOBIN g/dL 14.3   PLATELETS 10*3/mm3 243     BMP   Results from last 7 days   Lab Units 03/15/24  0234   SODIUM  mmol/L 136   POTASSIUM mmol/L 4.9   CHLORIDE mmol/L 101   CO2 mmol/L 24.0   BUN mg/dL 16   CREATININE mg/dL 1.04   GLUCOSE mg/dL 142*     Radiology(recent) No radiology results for the last day    I reviewed the patient's new clinical results.    Assessment & Plan       Gallstones    Duodenal fistula    S/P cholecystectomy      43 y.o. male POD 1 status post laparoscopic cholecystectomy converted to open subtotal cholecystectomy with repair of duodenal fistula with modified Reilly patch    Strict NPO, continue IV fluids  Continue NG tube to low intermittent wall suction  Antiemetics and pain medication as needed  Encourage ambulation, out of bed to chair  PT consulted  Educated patient on using incentive spirometer bedside  Continue JOSETTE drain, strip and empty every shift and as needed  Monitor quality of JOSETTE drainage for signs of bile leak  Continue IV antibiotics          This note was created using Dragon Voice Recognition software.    WILLIAM Vasquez  03/15/24  09:36 EDT

## 2024-03-15 NOTE — PLAN OF CARE
Goal Outcome Evaluation:                Pt tolerated ambulation from bed to chair. XR completed to verify placement of NG. Strict NPO. Complaints of pain, but interventions effective, see MAR. Plan of care ongoing, will continue to monitor for pain levels.

## 2024-03-15 NOTE — CASE MANAGEMENT/SOCIAL WORK
Discharge Planning Assessment  [facility]     Patient Name: Shaun Shanks  MRN: [id number]  Today's Date: 3/15/2024    Admit Date: 3/14/2024    Plan: Home. Mom can transport at discharge   Discharge Needs Assessment       Row Name 03/15/24 1512       Living Environment    People in Home parent(s)    Name(s) of People in Home carolina Colon    Current Living Arrangements home    Potentially Unsafe Housing Conditions none    In the past 12 months has the electric, gas, oil, or water company threatened to shut off services in your home? No    Primary Care Provided by self    Provides Primary Care For no one    Family Caregiver if Needed parent(s)    Family Caregiver Names carolina Colon    Quality of Family Relationships helpful;involved;supportive    Able to Return to Prior Arrangements yes       Resource/Environmental Concerns    Resource/Environmental Concerns none    Transportation Concerns none       Transportation Needs    In the past 12 months, has lack of transportation kept you from medical appointments or from getting medications? no    In the past 12 months, has lack of transportation kept you from meetings, work, or from getting things needed for daily living? No       Food Insecurity    Within the past 12 months, you worried that your food would run out before you got the money to buy more. Never true    Within the past 12 months, the food you bought just didn't last and you didn't have money to get more. Never true       Transition Planning    Patient/Family Anticipates Transition to home with family    Patient/Family Anticipated Services at Transition none    Transportation Anticipated car, drives self;family or friend will provide       Discharge Needs Assessment    Readmission Within the Last 30 Days no previous admission in last 30 days    Equipment Currently Used at Home nebulizer;other (see comments);shower chair  has rolling walker, wheelchair but does not need to use at this time.    Concerns to be Addressed  care coordination/care conferences;discharge planning    Anticipated Changes Related to Illness none    Equipment Needed After Discharge none    Provided Post Acute Provider List? N/A    N/A Provider List Comment denies dc needs                   Discharge Plan       Row Name 03/15/24 1514       Plan    Plan Home. Mom can transport at discharge    Patient/Family in Agreement with Plan yes    Plan Comments Patient lives at home with his mom Patient drive, family  will transport at discharge. Patient performs ADL. PCP and pharmacy confirmed. Denies financial assistance needs for medication and/or food. Denies HH and/or rehab needs.                  Continued Care and Services - Admitted Since 3/14/2024    No active coordination exists for this encounter.       Expected Discharge Date and Time       Expected Discharge Date Expected Discharge Time    Mar 16, 2024            Demographic Summary       Row Name 03/15/24 1511       General Information    Admission Type inpatient    Arrived From home    Referral Source admission list    Reason for Consult discharge planning    Preferred Language English       Contact Information    Permission Granted to Share Info With                    Functional Status       Row Name 03/15/24 1512       Functional Status    Usual Activity Tolerance good    Current Activity Tolerance good       Functional Status, IADL    Medications assistive person    Meal Preparation independent    Housekeeping independent    Laundry independent    Shopping assistive person       Mental Status    General Appearance WDL WDL       Mental Status Summary    Recent Changes in Mental Status/Cognitive Functioning no changes                    Eliane Underwood, [place]

## 2024-03-16 ENCOUNTER — INPATIENT HOSPITAL (OUTPATIENT)
Dept: URBAN - METROPOLITAN AREA HOSPITAL 84 | Facility: HOSPITAL | Age: 44
End: 2024-03-16
Payer: MEDICAID

## 2024-03-16 DIAGNOSIS — K80.50 CALCULUS OF BILE DUCT WITHOUT CHOLANGITIS OR CHOLECYSTITIS W: ICD-10-CM

## 2024-03-16 LAB
ALBUMIN SERPL-MCNC: 3.8 G/DL (ref 3.5–5.2)
ALBUMIN SERPL-MCNC: 4 G/DL (ref 3.5–5.2)
ALBUMIN/GLOB SERPL: 1.2 G/DL
ALBUMIN/GLOB SERPL: 1.3 G/DL
ALP SERPL-CCNC: 74 U/L (ref 39–117)
ALP SERPL-CCNC: 84 U/L (ref 39–117)
ALT SERPL W P-5'-P-CCNC: 31 U/L (ref 1–41)
ALT SERPL W P-5'-P-CCNC: 40 U/L (ref 1–41)
ANION GAP SERPL CALCULATED.3IONS-SCNC: 12 MMOL/L (ref 5–15)
ANION GAP SERPL CALCULATED.3IONS-SCNC: 14 MMOL/L (ref 5–15)
AST SERPL-CCNC: 30 U/L (ref 1–40)
AST SERPL-CCNC: 36 U/L (ref 1–40)
BASOPHILS # BLD AUTO: 0.06 10*3/MM3 (ref 0–0.2)
BASOPHILS NFR BLD AUTO: 0.5 % (ref 0–1.5)
BILIRUB SERPL-MCNC: 1.1 MG/DL (ref 0–1.2)
BILIRUB SERPL-MCNC: 1.2 MG/DL (ref 0–1.2)
BUN SERPL-MCNC: 11 MG/DL (ref 6–20)
BUN SERPL-MCNC: 9 MG/DL (ref 6–20)
BUN/CREAT SERPL: 10.5 (ref 7–25)
BUN/CREAT SERPL: 10.9 (ref 7–25)
CALCIUM SPEC-SCNC: 8.8 MG/DL (ref 8.6–10.5)
CALCIUM SPEC-SCNC: 8.8 MG/DL (ref 8.6–10.5)
CHLORIDE SERPL-SCNC: 102 MMOL/L (ref 98–107)
CHLORIDE SERPL-SCNC: 107 MMOL/L (ref 98–107)
CO2 SERPL-SCNC: 20 MMOL/L (ref 22–29)
CO2 SERPL-SCNC: 23 MMOL/L (ref 22–29)
CREAT SERPL-MCNC: 0.86 MG/DL (ref 0.76–1.27)
CREAT SERPL-MCNC: 1.01 MG/DL (ref 0.76–1.27)
DEPRECATED RDW RBC AUTO: 44.4 FL (ref 37–54)
DEPRECATED RDW RBC AUTO: 46.1 FL (ref 37–54)
EGFRCR SERPLBLD CKD-EPI 2021: 108.8 ML/MIN/1.73
EGFRCR SERPLBLD CKD-EPI 2021: 93.5 ML/MIN/1.73
EOSINOPHIL # BLD AUTO: 0.14 10*3/MM3 (ref 0–0.4)
EOSINOPHIL NFR BLD AUTO: 1.2 % (ref 0.3–6.2)
ERYTHROCYTE [DISTWIDTH] IN BLOOD BY AUTOMATED COUNT: 12.9 % (ref 12.3–15.4)
ERYTHROCYTE [DISTWIDTH] IN BLOOD BY AUTOMATED COUNT: 13.2 % (ref 12.3–15.4)
GLOBULIN UR ELPH-MCNC: 3.1 GM/DL
GLOBULIN UR ELPH-MCNC: 3.2 GM/DL
GLUCOSE BLDC GLUCOMTR-MCNC: 122 MG/DL (ref 70–105)
GLUCOSE BLDC GLUCOMTR-MCNC: 143 MG/DL (ref 70–105)
GLUCOSE BLDC GLUCOMTR-MCNC: 146 MG/DL (ref 70–105)
GLUCOSE BLDC GLUCOMTR-MCNC: 157 MG/DL (ref 70–105)
GLUCOSE SERPL-MCNC: 153 MG/DL (ref 65–99)
GLUCOSE SERPL-MCNC: 158 MG/DL (ref 65–99)
HCT VFR BLD AUTO: 39.8 % (ref 37.5–51)
HCT VFR BLD AUTO: 42.7 % (ref 37.5–51)
HGB BLD-MCNC: 13 G/DL (ref 13–17.7)
HGB BLD-MCNC: 13.8 G/DL (ref 13–17.7)
IMM GRANULOCYTES # BLD AUTO: 0.05 10*3/MM3 (ref 0–0.05)
IMM GRANULOCYTES NFR BLD AUTO: 0.4 % (ref 0–0.5)
LYMPHOCYTES # BLD AUTO: 2.28 10*3/MM3 (ref 0.7–3.1)
LYMPHOCYTES NFR BLD AUTO: 19.6 % (ref 19.6–45.3)
MAGNESIUM SERPL-MCNC: 1.7 MG/DL (ref 1.6–2.6)
MAGNESIUM SERPL-MCNC: 1.9 MG/DL (ref 1.6–2.6)
MCH RBC QN AUTO: 30.5 PG (ref 26.6–33)
MCH RBC QN AUTO: 30.7 PG (ref 26.6–33)
MCHC RBC AUTO-ENTMCNC: 32.3 G/DL (ref 31.5–35.7)
MCHC RBC AUTO-ENTMCNC: 32.7 G/DL (ref 31.5–35.7)
MCV RBC AUTO: 93.4 FL (ref 79–97)
MCV RBC AUTO: 94.9 FL (ref 79–97)
MONOCYTES # BLD AUTO: 1.04 10*3/MM3 (ref 0.1–0.9)
MONOCYTES NFR BLD AUTO: 9 % (ref 5–12)
NEUTROPHILS NFR BLD AUTO: 69.3 % (ref 42.7–76)
NEUTROPHILS NFR BLD AUTO: 8.05 10*3/MM3 (ref 1.7–7)
NRBC BLD AUTO-RTO: 0 /100 WBC (ref 0–0.2)
PHOSPHATE SERPL-MCNC: 2.3 MG/DL (ref 2.5–4.5)
PHOSPHATE SERPL-MCNC: 3 MG/DL (ref 2.5–4.5)
PLATELET # BLD AUTO: 179 10*3/MM3 (ref 140–450)
PLATELET # BLD AUTO: 181 10*3/MM3 (ref 140–450)
PMV BLD AUTO: 10.3 FL (ref 6–12)
PMV BLD AUTO: 10.6 FL (ref 6–12)
POTASSIUM SERPL-SCNC: 4.3 MMOL/L (ref 3.5–5.2)
POTASSIUM SERPL-SCNC: 4.9 MMOL/L (ref 3.5–5.2)
PROT SERPL-MCNC: 7 G/DL (ref 6–8.5)
PROT SERPL-MCNC: 7.1 G/DL (ref 6–8.5)
RBC # BLD AUTO: 4.26 10*6/MM3 (ref 4.14–5.8)
RBC # BLD AUTO: 4.5 10*6/MM3 (ref 4.14–5.8)
SODIUM SERPL-SCNC: 136 MMOL/L (ref 136–145)
SODIUM SERPL-SCNC: 142 MMOL/L (ref 136–145)
WBC NRBC COR # BLD AUTO: 11.27 10*3/MM3 (ref 3.4–10.8)
WBC NRBC COR # BLD AUTO: 11.62 10*3/MM3 (ref 3.4–10.8)

## 2024-03-16 PROCEDURE — 83735 ASSAY OF MAGNESIUM: CPT | Performed by: HOSPITALIST

## 2024-03-16 PROCEDURE — 82948 REAGENT STRIP/BLOOD GLUCOSE: CPT

## 2024-03-16 PROCEDURE — 94799 UNLISTED PULMONARY SVC/PX: CPT

## 2024-03-16 PROCEDURE — 25010000002 HYDROMORPHONE 1 MG/ML SOLUTION: Performed by: SURGERY

## 2024-03-16 PROCEDURE — 85027 COMPLETE CBC AUTOMATED: CPT | Performed by: HOSPITALIST

## 2024-03-16 PROCEDURE — 99024 POSTOP FOLLOW-UP VISIT: CPT | Performed by: SURGERY

## 2024-03-16 PROCEDURE — 99233 SBSQ HOSP IP/OBS HIGH 50: CPT | Mod: FS | Performed by: NURSE PRACTITIONER

## 2024-03-16 PROCEDURE — 82948 REAGENT STRIP/BLOOD GLUCOSE: CPT | Performed by: NURSE PRACTITIONER

## 2024-03-16 PROCEDURE — 25010000002 HYDRALAZINE PER 20 MG: Performed by: HOSPITALIST

## 2024-03-16 PROCEDURE — 84100 ASSAY OF PHOSPHORUS: CPT | Performed by: HOSPITALIST

## 2024-03-16 PROCEDURE — 25010000002 LABETALOL 5 MG/ML SOLUTION: Performed by: HOSPITALIST

## 2024-03-16 PROCEDURE — 85025 COMPLETE CBC W/AUTO DIFF WBC: CPT | Performed by: HOSPITALIST

## 2024-03-16 PROCEDURE — 25810000003 SODIUM CHLORIDE 0.9 % SOLUTION: Performed by: SURGERY

## 2024-03-16 PROCEDURE — 80053 COMPREHEN METABOLIC PANEL: CPT | Performed by: HOSPITALIST

## 2024-03-16 PROCEDURE — 97116 GAIT TRAINING THERAPY: CPT

## 2024-03-16 PROCEDURE — 25010000002 PIPERACILLIN SOD-TAZOBACTAM PER 1 G: Performed by: SURGERY

## 2024-03-16 PROCEDURE — 25810000003 SODIUM CHLORIDE 0.9 % SOLUTION: Performed by: HOSPITALIST

## 2024-03-16 RX ORDER — PANTOPRAZOLE SODIUM 40 MG/10ML
40 INJECTION, POWDER, LYOPHILIZED, FOR SOLUTION INTRAVENOUS
Status: DISCONTINUED | OUTPATIENT
Start: 2024-03-17 | End: 2024-03-20 | Stop reason: HOSPADM

## 2024-03-16 RX ORDER — HYDRALAZINE HYDROCHLORIDE 20 MG/ML
10 INJECTION INTRAMUSCULAR; INTRAVENOUS EVERY 6 HOURS PRN
Status: DISCONTINUED | OUTPATIENT
Start: 2024-03-16 | End: 2024-03-20 | Stop reason: HOSPADM

## 2024-03-16 RX ORDER — FENTANYL/ROPIVACAINE/NS/PF 2-625MCG/1
15 PLASTIC BAG, INJECTION (ML) EPIDURAL ONCE
Status: COMPLETED | OUTPATIENT
Start: 2024-03-16 | End: 2024-03-16

## 2024-03-16 RX ORDER — LABETALOL HYDROCHLORIDE 5 MG/ML
10 INJECTION, SOLUTION INTRAVENOUS EVERY 6 HOURS PRN
Status: DISCONTINUED | OUTPATIENT
Start: 2024-03-16 | End: 2024-03-20 | Stop reason: HOSPADM

## 2024-03-16 RX ADMIN — HYDRALAZINE HYDROCHLORIDE 10 MG: 20 INJECTION INTRAMUSCULAR; INTRAVENOUS at 17:33

## 2024-03-16 RX ADMIN — POTASSIUM PHOSPHATE, MONOBASIC AND POTASSIUM PHOSPHATE, DIBASIC 15 MMOL: 224; 236 INJECTION, SOLUTION, CONCENTRATE INTRAVENOUS at 12:34

## 2024-03-16 RX ADMIN — HYDROMORPHONE HYDROCHLORIDE 0.5 MG: 1 INJECTION, SOLUTION INTRAMUSCULAR; INTRAVENOUS; SUBCUTANEOUS at 00:58

## 2024-03-16 RX ADMIN — PIPERACILLIN AND TAZOBACTAM 4.5 G: 4; .5 INJECTION, POWDER, FOR SOLUTION INTRAVENOUS at 08:24

## 2024-03-16 RX ADMIN — SODIUM CHLORIDE 100 ML/HR: 9 INJECTION, SOLUTION INTRAVENOUS at 04:00

## 2024-03-16 RX ADMIN — Medication 10 MG: at 18:43

## 2024-03-16 RX ADMIN — PIPERACILLIN AND TAZOBACTAM 4.5 G: 4; .5 INJECTION, POWDER, FOR SOLUTION INTRAVENOUS at 00:52

## 2024-03-16 RX ADMIN — IPRATROPIUM BROMIDE AND ALBUTEROL SULFATE 3 ML: .5; 3 SOLUTION RESPIRATORY (INHALATION) at 07:48

## 2024-03-16 RX ADMIN — IPRATROPIUM BROMIDE AND ALBUTEROL SULFATE 3 ML: .5; 3 SOLUTION RESPIRATORY (INHALATION) at 11:37

## 2024-03-16 RX ADMIN — HYDROMORPHONE HYDROCHLORIDE 0.5 MG: 1 INJECTION, SOLUTION INTRAMUSCULAR; INTRAVENOUS; SUBCUTANEOUS at 11:33

## 2024-03-16 RX ADMIN — PIPERACILLIN AND TAZOBACTAM 4.5 G: 4; .5 INJECTION, POWDER, FOR SOLUTION INTRAVENOUS at 17:22

## 2024-03-16 RX ADMIN — HYDROMORPHONE HYDROCHLORIDE 0.5 MG: 1 INJECTION, SOLUTION INTRAMUSCULAR; INTRAVENOUS; SUBCUTANEOUS at 17:22

## 2024-03-16 RX ADMIN — HYDROMORPHONE HYDROCHLORIDE 0.5 MG: 1 INJECTION, SOLUTION INTRAMUSCULAR; INTRAVENOUS; SUBCUTANEOUS at 20:14

## 2024-03-16 RX ADMIN — HYDROMORPHONE HYDROCHLORIDE 0.5 MG: 1 INJECTION, SOLUTION INTRAMUSCULAR; INTRAVENOUS; SUBCUTANEOUS at 07:11

## 2024-03-16 RX ADMIN — IPRATROPIUM BROMIDE AND ALBUTEROL SULFATE 3 ML: .5; 3 SOLUTION RESPIRATORY (INHALATION) at 15:58

## 2024-03-16 RX ADMIN — IPRATROPIUM BROMIDE AND ALBUTEROL SULFATE 3 ML: .5; 3 SOLUTION RESPIRATORY (INHALATION) at 20:03

## 2024-03-16 RX ADMIN — HYDRALAZINE HYDROCHLORIDE 10 MG: 20 INJECTION INTRAMUSCULAR; INTRAVENOUS at 12:34

## 2024-03-16 RX ADMIN — PANTOPRAZOLE SODIUM 40 MG: 40 INJECTION, POWDER, FOR SOLUTION INTRAVENOUS at 08:24

## 2024-03-16 NOTE — PROGRESS NOTES
General Surgery Progress Note    Name: Shaun Shanks ADMIT: 3/14/2024   : 1980  PCP: Diann Wallace MD    MRN: [id number] LOS: 2 days   AGE/SEX: 43 y.o. male  ROOM: [address] [place] [address]    No chief complaint on file.    Subjective     Patient seen and examined.  Vital signs stable, afebrile.  Doing okay this morning, no new complaints.  Pain is controlled.  Currently NPO with NG tube in place, denies nausea or vomiting.  Not passing gas, no BM.  Leukocytosis improving, hemoglobin stable.  NG tube with 170 mL output x 24 hours, JOSETTE with 95 mL recorded x 24 hours.    Objective     Scheduled Medications:   insulin lispro, 2-9 Units, Subcutaneous, Q6H  ipratropium-albuterol, 3 mL, Nebulization, 4x Daily - RT  midazolam, 0.5 mg, Intravenous, Once  pantoprazole, 40 mg, Intravenous, Q12H  piperacillin-tazobactam, 4.5 g, Intravenous, Q8H        Active Infusions:  Pharmacy to Dose Zosyn,   sodium chloride, 100 mL/hr, Last Rate: 100 mL/hr (24 0400)        As Needed Medications:    albuterol sulfate HFA    dextrose    dextrose    glucagon (human recombinant)    hydrALAZINE    HYDROmorphone **AND** naloxone    Morphine **AND** naloxone    OLANZapine (zyPREXA) 5 mg in sterile water (preservative free) 1 mL injection    ondansetron ODT **OR** ondansetron    Pharmacy to Dose Zosyn    Vital Signs  Vital Signs Patient Vitals for the past 24 hrs:   BP Temp Temp src Pulse Resp SpO2   24 0753 -- -- -- 91 16 100 %   24 0748 -- -- -- 85 16 95 %   24 0429 174/96 97.8 °F (36.6 °C) Oral 102 18 93 %   24 0140 170/88 98.4 °F (36.9 °C) Oral 106 18 94 %   03/15/24 2100 140/85 98.6 °F (37 °C) Oral -- 20 --   03/15/24 2009 -- -- -- 87 20 99 %   03/15/24 2004 -- -- -- 88 18 92 %   03/15/24 1702 -- 98.4 °F (36.9 °C) Oral 92 18 --   03/15/24 1537 -- -- -- 81 16 97 %   03/15/24 1530 -- -- -- 72 16 95 %   03/15/24 1223 174/82 98.6 °F (37 °C) Oral 100 16 96 %   03/15/24 1159 -- -- -- 78 16 97 %   03/15/24 1153 -- --  -- 78 16 97 %     I/O:  I/O last 3 completed shifts:  In: 1050 [I.V.:1050]  Out: 2315 [Urine:1950; Emesis/NG output:170; Drains:195]    Physical Exam:  Physical Exam  Constitutional:       General: He is not in acute distress.  Cardiovascular:      Rate and Rhythm: Normal rate.   Pulmonary:      Effort: No respiratory distress.      Comments: On 2 L nasal cannula  Abdominal:      Palpations: Abdomen is soft.      Comments: Soft, minimal distention, appropriately tender.  Incisions covered by fatou dressing with minimal saturation noted.  JOSETTE with serosanguineous output.   Neurological:      Mental Status: He is alert and oriented to person, place, and time.   Psychiatric:         Behavior: Behavior normal.         Results Review:     CBC    Results from last 7 days   Lab Units 03/16/24  0105 03/15/24  0234   WBC 10*3/mm3 11.27* 18.00*   HEMOGLOBIN g/dL 13.8 14.3   PLATELETS 10*3/mm3 179 243     BMP   Results from last 7 days   Lab Units 03/16/24  0105 03/15/24  0234   SODIUM mmol/L 142 136   POTASSIUM mmol/L 4.9 4.9   CHLORIDE mmol/L 107 101   CO2 mmol/L 23.0 24.0   BUN mg/dL 11 16   CREATININE mg/dL 1.01 1.04   GLUCOSE mg/dL 153* 142*   MAGNESIUM mg/dL 1.9  --    PHOSPHORUS mg/dL 2.3*  --      Radiology(recent) XR Abdomen KUB    Result Date: 3/15/2024  Impression: 1.Nasogastric tube tip is within the stomach. 2.Nonspecific distention of visualized bowel that might relate to ileus and should be correlated with clinical findings. Electronically Signed: Enzo Chaparro MD  3/15/2024 12:51 PM EDT  Workstation ID: [id number]     I reviewed the patient's new clinical results.    Assessment & Plan       Gallstones    Duodenal fistula    S/P cholecystectomy    43 y.o. male POD 2 status post laparoscopic cholecystectomy converted to open subtotal cholecystectomy with repair of duodenal fistula with modified Reilly patch     Strict NPO, continue IV fluids  Continue NG tube to low intermittent wall suction  Antiemetics and pain  medication as needed  Needs to be be as active as possible, out of bed to chair  Continue working with PT/OT  Continue to use incentive spirometer bedside  Continue JOSETTE drain, strip and empty every shift and as needed  Monitor quality of JOSETTE drainage for signs of bile leak  Continue IV antibiotics  Planning for upper GI on Monday  Will continue to follow        This note was created using Dragon Voice Recognition software.    WILLIAM Vasquez  03/16/24  09:21 EDT

## 2024-03-16 NOTE — PROGRESS NOTES
" LOS: 2 days   Patient Care Team:  Diann Wallace MD as PCP - General (Family Medicine)  Antoine Ivey MD as Surgeon (General Surgery)      Subjective   \" Feeling a little bit better.  I just got pain medication.\"    Interval History:   Labs: WBCs 11.27 (18), hemoglobin 13.8, platelets 179.  LFTs are normal.  BMP normal.  NG tube to suction with about 200 cc of greenish liquid.    ROS:   No chest pain, shortness of breath, or cough.         Medication Review:     Current Facility-Administered Medications:     albuterol sulfate HFA (PROVENTIL HFA;VENTOLIN HFA;PROAIR HFA) inhaler 2 puff, 2 puff, Inhalation, Q4H PRN, Antoine Ivey MD    dextrose (D50W) (25 g/50 mL) IV injection 25 g, 25 g, Intravenous, Q15 Min PRN, Juliet Huizar APRN    dextrose (GLUTOSE) oral gel 15 g, 15 g, Oral, Q15 Min PRN, Juliet Huizar APRN    glucagon (GLUCAGEN) injection 1 mg, 1 mg, Intramuscular, Q15 Min PRN, Juliet Huizar APRN    hydrALAZINE (APRESOLINE) injection 10 mg, 10 mg, Intravenous, Q6H PRN, Shira Cadena MD, 10 mg at 03/16/24 1234    HYDROmorphone (DILAUDID) injection 0.5 mg, 0.5 mg, Intravenous, Q2H PRN, 0.5 mg at 03/16/24 1133 **AND** naloxone (NARCAN) injection 0.1 mg, 0.1 mg, Intravenous, Q5 Min PRN, Antoine Ivey MD    insulin lispro (HUMALOG/ADMELOG) injection 2-9 Units, 2-9 Units, Subcutaneous, Q6H, Juliet Huizar APRN, 2 Units at 03/15/24 1216    ipratropium-albuterol (DUO-NEB) nebulizer solution 3 mL, 3 mL, Nebulization, 4x Daily - RT, Greg Nevarez MD, 3 mL at 03/16/24 1137    midazolam (VERSED) injection 0.5 mg, 0.5 mg, Intravenous, Once, Shira Cadena MD    morphine injection 4 mg, 4 mg, Intravenous, Q2H PRN **AND** naloxone (NARCAN) injection 0.4 mg, 0.4 mg, Intravenous, Q5 Min PRN, Antoine Ivey MD    OLANZapine (zyPREXA) 5 mg in sterile water (preservative free) 1 mL injection, 5 mg, Intramuscular, Q8H PRN, Greg Nevarez MD, 5 mg at 03/15/24 0153    ondansetron " ODT (ZOFRAN-ODT) disintegrating tablet 4 mg, 4 mg, Oral, Q6H PRN **OR** ondansetron (ZOFRAN) injection 4 mg, 4 mg, Intravenous, Q6H PRN, Antoine Ivey MD    pantoprazole (PROTONIX) injection 40 mg, 40 mg, Intravenous, Q12H, Antoine Ivey MD, 40 mg at 03/16/24 0824    Pharmacy to Dose Zosyn, , Does not apply, Continuous PRN, Antoine Ivey MD    piperacillin-tazobactam (ZOSYN) 4.5 g IVPB in 100 mL NS MBP (CD), 4.5 g, Intravenous, Q8H, Antoine Ivey MD, Held at 03/16/24 0930    potassium phosphate 15 mmol in 0.9% normal saline 250 mL IVPB, 15 mmol, Intravenous, Once, Shira Cadena MD, 15 mmol at 03/16/24 1234    sodium chloride 0.9 % infusion, 100 mL/hr, Intravenous, Continuous, Antoine Ivey MD, Last Rate: 100 mL/hr at 03/16/24 0400, 100 mL/hr at 03/16/24 0400      Objective resting in the hospital bed.  NAD.  No family present.  Continued tremor/tics noted.    Vital Signs  Temp:  [97.8 °F (36.6 °C)-98.6 °F (37 °C)] 98 °F (36.7 °C)  Heart Rate:  [] 90  Resp:  [16-20] 16  BP: (140-174)/() 163/108  Physical Exam:    General Appearance:    Awake and alert, in no acute distress   Head:    Normocephalic, without obvious abnormality   Eyes:          Conjunctivae normal, anicteric sclerae   Ears:    Hearing intact   Throat:   No oral lesions, no thrush, oral mucosa moist   Neck:   No adenopathy, supple, no JVD   Lungs:     respirations regular, even and unlabored       Abdomen:     soft, non-tender, no rebound or guarding, non-distended, no hepatosplenomegaly   Rectal:     Deferred   Extremities:   No edema, no cyanosis, no redness   Skin:   No bleeding, bruising or rash, no jaundice   Neurologic:   Cranial nerves 2 - 12 grossly intact, no asterixis, sensation   intact        Results Review:    CBC    Results from last 7 days   Lab Units 03/16/24  0105 03/15/24  0234   WBC 10*3/mm3 11.27* 18.00*   HEMOGLOBIN g/dL 13.8 14.3   PLATELETS 10*3/mm3 179 243     CMP  "  Results from last 7 days   Lab Units 03/16/24  0105 03/15/24  0234   SODIUM mmol/L 142 136   POTASSIUM mmol/L 4.9 4.9   CHLORIDE mmol/L 107 101   CO2 mmol/L 23.0 24.0   BUN mg/dL 11 16   CREATININE mg/dL 1.01 1.04   GLUCOSE mg/dL 153* 142*   ALBUMIN g/dL 4.0 4.2   BILIRUBIN mg/dL 1.1 0.8   ALK PHOS U/L 84 94   AST (SGOT) U/L 36 43*   ALT (SGPT) U/L 40 51*   MAGNESIUM mg/dL 1.9  --    PHOSPHORUS mg/dL 2.3*  --      Cr Clearance Estimated Creatinine Clearance: 129.5 mL/min (by C-G formula based on SCr of 1.01 mg/dL).  Coag     HbA1C No results found for: \"HGBA1C\"  Blood Glucose   Glucose   Date/Time Value Ref Range Status   03/16/2024 1127 143 (H) 70 - 105 mg/dL Final     Comment:     Serial Number: [id number]:  [id number]   03/16/2024 0535 146 (H) 70 - 105 mg/dL Final     Comment:     Serial Number: [id number]:  [id number]   03/16/2024 0209 157 (H) 70 - 105 mg/dL Final     Comment:     Serial Number: [id number]:  [id number]   03/15/2024 1740 130 (H) 70 - 105 mg/dL Final     Comment:     Serial Number: [id number]:  [id number]   03/15/2024 1154 157 (H) 70 - 105 mg/dL Final     Comment:     Serial Number: [id number]:  [id number]   03/15/2024 0718 181 (H) 70 - 105 mg/dL Final     Comment:     Serial Number: [id number]:  [id number]   03/14/2024 2033 175 (H) 70 - 105 mg/dL Final     Comment:     Serial Number: [id number]:  [id number]   03/14/2024 1522 210 (H) 70 - 105 mg/dL Final     Comment:     Serial Number: [id number]:  [id number]     Infection   Results from last 7 days   Lab Units 03/15/24  0234   PROCALCITONIN ng/mL 0.66*     UA      Radiology(recent) XR Abdomen KUB    Result Date: 3/15/2024  Impression: 1.Nasogastric tube tip is within the stomach. 2.Nonspecific distention of visualized bowel that might relate to ileus and should be correlated with clinical findings. Electronically Signed: Enzo Chaparro MD  3/15/2024 12:51 PM EDT  Workstation ID: [id number]   "       Assessment & Plan   Patient is a 43-year-old male who came in for an elective cholecystectomy due to gallstones and ended up having an open subtotal cholecystectomy.  Had a duodenal fistula which was repaired with a Reilly patch all on 3/14/2024.  GI was consulted for possible ERCP.     Gallstones status post subtotal cholecystectomy 3/14/2024  Duodenal fistula with Reilly patch repair 3/14/2024  Chronic hepatitis C previously treated and cured with Epclusa now on Vosevi  Hepatitis B core antibody positive  COPD  History of DVT  Diabetes  Anxiety  Hypertension  Elevated LFTs consider related to hepatitis as well as gallstones  Leukocytosis    PLAN:   Patient had 235 cc of serosanguineous drainage on 3/14/2023, 95 cc so far today.   Pain is pretty well controlled on IV pain meds.   We will follow.   Continue Hep C treatment. Hold PPI while on Hep C treatment.   Patient is on Zosyn.      Trixie Crawford, APRN  03/16/24  14:51 EDT

## 2024-03-16 NOTE — PLAN OF CARE
Goal Outcome Evaluation:    Patient able to make needs known. Pain controlled per MAR. Activity encouraged.

## 2024-03-16 NOTE — PROGRESS NOTES
[facility] MEDICINE SERVICE  DAILY PROGRESS NOTE    NAME: Shaun Shanks  : 1980  MRN: [id number]      LOS: 2 days     PROVIDER OF SERVICE: Shira Cadena MD    Chief Complaint: Gallstones    Subjective:     Interval History: Patient seen and examined, seems somewhat anxious, Versed IV ordered.  He is n.p.o. currently.    3/16 pt seen and examined, he remains n.p.o. and continues to have an NG tube in.  Feeling somewhat better as compared to yesterday.      Review of Systems:   Review of Systems  10 point ROS is negative other than what is stated positive above  Objective:     Vital Signs  Temp:  [97.8 °F (36.6 °C)-98.6 °F (37 °C)] 97.8 °F (36.6 °C)  Heart Rate:  [] 91  Resp:  [16-20] 16  BP: (140-174)/(82-96) 174/96   Body mass index is 37.34 kg/m².    Physical Exam  Physical Exam  Awake alert obese.  Seems anxious, NG tube present  HEENT normocephalic atraumatic  Chest clear to auscultation bilaterally  CVs S1-S2 normal, regular in rhythm  Abdomen soft no tenderness.  JOSETTE drain present.  Site looks clean.  Bowel sounds hypoactive  Extremities warm to touch pulses no edema    Scheduled Meds   insulin lispro, 2-9 Units, Subcutaneous, Q6H  ipratropium-albuterol, 3 mL, Nebulization, 4x Daily - RT  midazolam, 0.5 mg, Intravenous, Once  pantoprazole, 40 mg, Intravenous, Q12H  piperacillin-tazobactam, 4.5 g, Intravenous, Q8H       PRN Meds     albuterol sulfate HFA    dextrose    dextrose    glucagon (human recombinant)    hydrALAZINE    HYDROmorphone **AND** naloxone    Morphine **AND** naloxone    OLANZapine (zyPREXA) 5 mg in sterile water (preservative free) 1 mL injection    ondansetron ODT **OR** ondansetron    Pharmacy to Dose Zosyn   Infusions  Pharmacy to Dose Zosyn,   sodium chloride, 100 mL/hr, Last Rate: 100 mL/hr (24 0400)          Diagnostic Data    Results from last 7 days   Lab Units 24  0105   WBC 10*3/mm3 11.27*   HEMOGLOBIN g/dL 13.8   HEMATOCRIT % 42.7   PLATELETS  10*3/mm3 179   GLUCOSE mg/dL 153*   CREATININE mg/dL 1.01   BUN mg/dL 11   SODIUM mmol/L 142   POTASSIUM mmol/L 4.9   AST (SGOT) U/L 36   ALT (SGPT) U/L 40   ALK PHOS U/L 84   BILIRUBIN mg/dL 1.1   ANION GAP mmol/L 12.0       XR Abdomen KUB    Result Date: 3/15/2024  Impression: 1.Nasogastric tube tip is within the stomach. 2.Nonspecific distention of visualized bowel that might relate to ileus and should be correlated with clinical findings. Electronically Signed: Enzo Chaparro MD  3/15/2024 12:51 PM EDT  Workstation ID: [id number]       I reviewed the patient's new clinical results.    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     # Cholelithiasis status post open subtotal cholecystectomy  -Patient is admitted for elective surgery.  Patient has symptomatic cholelithiasis.  -Patient close surgery laparoscopic cholecystectomy converted to open subtotal cholecystectomy on March 14.  -Management per general surgery-currently strict n.p.o. and continue IV fluids.  Patient has NG tube connected to low intermittent wall suction.  -As needed pain meds and as needed nausea meds.  -Patient has a JOSETTE drain, plan to strip and empty every shift and as needed.    -Continue IV antibiotics with IV Zosyn- white count improving.  Continue to monitor for now.  Check procalcitonin.  -Apparently GI was also consulted for possible ERCP however did not recommend ERCP at this time secondary to fresh surgery.  Recommend continuing to monitor JOSETTE drain output.     #Anxiety  -zyprexa as needed's ordered.     #COPD  -DuoNeb     #Diabetes mellitus  -  N.p.o. currently.  Started on SSI and once patient is able to eat     #Hypertension  -Hydralazine as needed is ordered.  Currently strict NPO.  Resume home medication once  clinically appropriate    Hep C   Needs treatment per GI.              Nutrition: No diet orders on file            DVT prophylaxis:  Mechanical DVT prophylaxis orders are present.         Code status is   Code Status and Medical Interventions:   Ordered at: 03/14/24 1833     Code Status (Patient has no pulse and is not breathing):    CPR (Attempt to Resuscitate)     Medical Interventions (Patient has pulse or is breathing):    Full       Plan for disposition: Pending clinical course    Time: 30 minutes    Signature: Electronically signed by Shira Cadena MD, 03/16/24, 09:31 EDT.  [facility] Hospitalist Team

## 2024-03-16 NOTE — PLAN OF CARE
Assessment: Shaun Shanks presents with functional mobility impairments which indicate the need for skilled intervention. Tolerating session today without incident. Pt still has dieter drain. Amb without any LOB. Plans on home with Mom at [place].Will continue to follow and progress as tolerated.

## 2024-03-16 NOTE — THERAPY TREATMENT NOTE
Subjective: Pt agreeable to therapeutic plan of care. RN oked pt to be off wall suction to amb    Objective:     Bed mobility - Modified-Independent  Transfers - SBA  Ambulation - 75 feet CGA while he pushes IV pole    Vitals: WNL    Pain:  abdomen from sx  Intervention for pain: Repositioned and Therapeutic Presence    Education: Provided education on the importance of mobility in the acute care setting, Verbal/Tactile Cues, Transfer Training, and Gait Training    Assessment: Shaun Shanks presents with functional mobility impairments which indicate the need for skilled intervention. Tolerating session today without incident. Pt still has dieter drain. Amb without any LOB. Plans on home with Mom at [place].Will continue to follow and progress as tolerated.     Plan/Recommendations:   If medically appropriate, No ongoing therapy recommended post-acute care. No therapy needs. Pt requires no DME at discharge.     Pt desires Home with family assist at discharge. Pt cooperative; agreeable to therapeutic recommendations and plan of care.         Basic Mobility 6-click:  Rollin = Total, A lot = 2, A little = 3; 4 = None  Supine>Sit:   1 = Total, A lot = 2, A little = 3; 4 = None   Sit>Stand with arms:  1 = Total, A lot = 2, A little = 3; 4 = None  Bed>Chair:   1 = Total, A lot = 2, A little = 3; 4 = None  Ambulate in room:  1 = Total, A lot = 2, A little = 3; 4 = None  3-5 Steps with railin = Total, A lot = 2, A little = 3; 4 = None  Score: 22    Post-Tx Position: Up in Chair and Call light and personal items within reach  PPE: gloves

## 2024-03-16 NOTE — PLAN OF CARE
Goal Outcome Evaluation: Upon change in shift patient resting bed, presenting with NG tube and JOSETTE drain. Patient has been quiet and sleeping between care this shift. No complaints of nausea and vomiting. Patient has complained of pain this shift, see MAR for interventions. Patient using ice pack on abdomin prn. Tolerating antibiotics well and complaints at his time. Continues on strict NPO diet at this time.

## 2024-03-17 ENCOUNTER — APPOINTMENT (OUTPATIENT)
Dept: GENERAL RADIOLOGY | Facility: HOSPITAL | Age: 44
DRG: 330 | End: 2024-03-17
Payer: MEDICARE

## 2024-03-17 ENCOUNTER — INPATIENT HOSPITAL (OUTPATIENT)
Dept: URBAN - METROPOLITAN AREA HOSPITAL 84 | Facility: HOSPITAL | Age: 44
End: 2024-03-17
Payer: MEDICAID

## 2024-03-17 DIAGNOSIS — K80.50 CALCULUS OF BILE DUCT WITHOUT CHOLANGITIS OR CHOLECYSTITIS W: ICD-10-CM

## 2024-03-17 LAB
ANION GAP SERPL CALCULATED.3IONS-SCNC: 13 MMOL/L (ref 5–15)
BASOPHILS # BLD AUTO: 0.05 10*3/MM3 (ref 0–0.2)
BASOPHILS NFR BLD AUTO: 0.4 % (ref 0–1.5)
BUN SERPL-MCNC: 9 MG/DL (ref 6–20)
BUN/CREAT SERPL: 10.8 (ref 7–25)
CALCIUM SPEC-SCNC: 9.4 MG/DL (ref 8.6–10.5)
CHLORIDE SERPL-SCNC: 100 MMOL/L (ref 98–107)
CO2 SERPL-SCNC: 23 MMOL/L (ref 22–29)
CREAT SERPL-MCNC: 0.83 MG/DL (ref 0.76–1.27)
DEPRECATED RDW RBC AUTO: 43.6 FL (ref 37–54)
EGFRCR SERPLBLD CKD-EPI 2021: 110 ML/MIN/1.73
EOSINOPHIL # BLD AUTO: 0.24 10*3/MM3 (ref 0–0.4)
EOSINOPHIL NFR BLD AUTO: 2.1 % (ref 0.3–6.2)
ERYTHROCYTE [DISTWIDTH] IN BLOOD BY AUTOMATED COUNT: 12.9 % (ref 12.3–15.4)
GLUCOSE BLDC GLUCOMTR-MCNC: 109 MG/DL (ref 70–105)
GLUCOSE BLDC GLUCOMTR-MCNC: 116 MG/DL (ref 70–105)
GLUCOSE BLDC GLUCOMTR-MCNC: 136 MG/DL (ref 70–105)
GLUCOSE BLDC GLUCOMTR-MCNC: 137 MG/DL (ref 70–105)
GLUCOSE SERPL-MCNC: 126 MG/DL (ref 65–99)
HCT VFR BLD AUTO: 40.7 % (ref 37.5–51)
HGB BLD-MCNC: 13.5 G/DL (ref 13–17.7)
IMM GRANULOCYTES # BLD AUTO: 0.05 10*3/MM3 (ref 0–0.05)
IMM GRANULOCYTES NFR BLD AUTO: 0.4 % (ref 0–0.5)
LYMPHOCYTES # BLD AUTO: 1.77 10*3/MM3 (ref 0.7–3.1)
LYMPHOCYTES NFR BLD AUTO: 15.4 % (ref 19.6–45.3)
MCH RBC QN AUTO: 30.7 PG (ref 26.6–33)
MCHC RBC AUTO-ENTMCNC: 33.2 G/DL (ref 31.5–35.7)
MCV RBC AUTO: 92.5 FL (ref 79–97)
MONOCYTES # BLD AUTO: 0.84 10*3/MM3 (ref 0.1–0.9)
MONOCYTES NFR BLD AUTO: 7.3 % (ref 5–12)
NEUTROPHILS NFR BLD AUTO: 74.4 % (ref 42.7–76)
NEUTROPHILS NFR BLD AUTO: 8.54 10*3/MM3 (ref 1.7–7)
NRBC BLD AUTO-RTO: 0 /100 WBC (ref 0–0.2)
PLATELET # BLD AUTO: 190 10*3/MM3 (ref 140–450)
PMV BLD AUTO: 10.5 FL (ref 6–12)
POTASSIUM SERPL-SCNC: 4.3 MMOL/L (ref 3.5–5.2)
RBC # BLD AUTO: 4.4 10*6/MM3 (ref 4.14–5.8)
SODIUM SERPL-SCNC: 136 MMOL/L (ref 136–145)
WBC NRBC COR # BLD AUTO: 11.49 10*3/MM3 (ref 3.4–10.8)

## 2024-03-17 PROCEDURE — 99233 SBSQ HOSP IP/OBS HIGH 50: CPT | Mod: FS | Performed by: NURSE PRACTITIONER

## 2024-03-17 PROCEDURE — 25010000002 HYDROMORPHONE 1 MG/ML SOLUTION: Performed by: SURGERY

## 2024-03-17 PROCEDURE — 85025 COMPLETE CBC W/AUTO DIFF WBC: CPT | Performed by: HOSPITALIST

## 2024-03-17 PROCEDURE — 82948 REAGENT STRIP/BLOOD GLUCOSE: CPT

## 2024-03-17 PROCEDURE — 94799 UNLISTED PULMONARY SVC/PX: CPT

## 2024-03-17 PROCEDURE — 74018 RADEX ABDOMEN 1 VIEW: CPT

## 2024-03-17 PROCEDURE — 82948 REAGENT STRIP/BLOOD GLUCOSE: CPT | Performed by: NURSE PRACTITIONER

## 2024-03-17 PROCEDURE — 99024 POSTOP FOLLOW-UP VISIT: CPT

## 2024-03-17 PROCEDURE — 25810000003 SODIUM CHLORIDE 0.9 % SOLUTION: Performed by: SURGERY

## 2024-03-17 PROCEDURE — 25010000002 MORPHINE PER 10 MG: Performed by: SURGERY

## 2024-03-17 PROCEDURE — 25010000002 PIPERACILLIN SOD-TAZOBACTAM PER 1 G: Performed by: SURGERY

## 2024-03-17 PROCEDURE — 80048 BASIC METABOLIC PNL TOTAL CA: CPT | Performed by: HOSPITALIST

## 2024-03-17 PROCEDURE — 25010000002 METHYLNALTREXONE 12 MG/0.6ML SOLUTION: Performed by: NURSE PRACTITIONER

## 2024-03-17 PROCEDURE — 94664 DEMO&/EVAL PT USE INHALER: CPT

## 2024-03-17 PROCEDURE — 25010000002 LABETALOL 5 MG/ML SOLUTION: Performed by: HOSPITALIST

## 2024-03-17 RX ADMIN — IPRATROPIUM BROMIDE AND ALBUTEROL SULFATE 3 ML: .5; 3 SOLUTION RESPIRATORY (INHALATION) at 16:19

## 2024-03-17 RX ADMIN — Medication 10 MG: at 11:51

## 2024-03-17 RX ADMIN — MORPHINE SULFATE 4 MG: 4 INJECTION, SOLUTION INTRAMUSCULAR; INTRAVENOUS at 14:12

## 2024-03-17 RX ADMIN — METHYLNALTREXONE BROMIDE 12 MG: 12 INJECTION, SOLUTION SUBCUTANEOUS at 11:46

## 2024-03-17 RX ADMIN — PIPERACILLIN AND TAZOBACTAM 4.5 G: 4; .5 INJECTION, POWDER, FOR SOLUTION INTRAVENOUS at 00:46

## 2024-03-17 RX ADMIN — HYDROMORPHONE HYDROCHLORIDE 0.5 MG: 1 INJECTION, SOLUTION INTRAMUSCULAR; INTRAVENOUS; SUBCUTANEOUS at 20:50

## 2024-03-17 RX ADMIN — IPRATROPIUM BROMIDE AND ALBUTEROL SULFATE 3 ML: .5; 3 SOLUTION RESPIRATORY (INHALATION) at 11:55

## 2024-03-17 RX ADMIN — HYDROMORPHONE HYDROCHLORIDE 0.5 MG: 1 INJECTION, SOLUTION INTRAMUSCULAR; INTRAVENOUS; SUBCUTANEOUS at 05:26

## 2024-03-17 RX ADMIN — HYDROMORPHONE HYDROCHLORIDE 0.5 MG: 1 INJECTION, SOLUTION INTRAMUSCULAR; INTRAVENOUS; SUBCUTANEOUS at 00:47

## 2024-03-17 RX ADMIN — PANTOPRAZOLE SODIUM 40 MG: 40 INJECTION, POWDER, FOR SOLUTION INTRAVENOUS at 08:00

## 2024-03-17 RX ADMIN — PANTOPRAZOLE SODIUM 40 MG: 40 INJECTION, POWDER, FOR SOLUTION INTRAVENOUS at 17:43

## 2024-03-17 RX ADMIN — Medication 10 MG: at 05:26

## 2024-03-17 RX ADMIN — HYDROMORPHONE HYDROCHLORIDE 0.5 MG: 1 INJECTION, SOLUTION INTRAMUSCULAR; INTRAVENOUS; SUBCUTANEOUS at 09:33

## 2024-03-17 RX ADMIN — IPRATROPIUM BROMIDE AND ALBUTEROL SULFATE 3 ML: .5; 3 SOLUTION RESPIRATORY (INHALATION) at 19:50

## 2024-03-17 RX ADMIN — SODIUM CHLORIDE 100 ML/HR: 9 INJECTION, SOLUTION INTRAVENOUS at 00:46

## 2024-03-17 RX ADMIN — PIPERACILLIN AND TAZOBACTAM 4.5 G: 4; .5 INJECTION, POWDER, FOR SOLUTION INTRAVENOUS at 17:43

## 2024-03-17 NOTE — PROGRESS NOTES
[facility] MEDICINE SERVICE  DAILY PROGRESS NOTE    NAME: Shaun Shanks  : 1980  MRN: [id number]      LOS: 3 days     PROVIDER OF SERVICE: Judd Siegel MD    Chief Complaint: Gallstones    Subjective:     Interval History: Patient seen and examined, seems somewhat anxious, Versed IV ordered.  He is n.p.o. currently.    3/16 pt seen and examined, he remains n.p.o. and continues to have an NG tube in.  Feeling somewhat better as compared to yesterday.      3/17  Pt NPO, noted to have NG tube in place. Denies for any nausea, vomiting or diarrhea.    Review of Systems:   Review of Systems  10 point ROS is negative other than what is stated positive above  Objective:     Vital Signs  Temp:  [97.5 °F (36.4 °C)-98 °F (36.7 °C)] 97.5 °F (36.4 °C)  Heart Rate:  [] 87  Resp:  [16-26] 16  BP: (156-197)/() 156/103   Body mass index is 37.34 kg/m².    Physical Exam  Physical Exam  Awake alert obese.  Seems anxious, NG tube present  HEENT normocephalic atraumatic  Chest clear to auscultation bilaterally  CVs S1-S2 normal, regular in rhythm  Abdomen soft no tenderness.  JOSETTE drain present.  Site looks clean.  Bowel sounds hypoactive  Extremities warm to touch pulses no edema    Scheduled Meds   insulin lispro, 2-9 Units, Subcutaneous, Q6H  ipratropium-albuterol, 3 mL, Nebulization, 4x Daily - RT  midazolam, 0.5 mg, Intravenous, Once  pantoprazole, 40 mg, Intravenous, BID AC  piperacillin-tazobactam, 4.5 g, Intravenous, Q8H       PRN Meds     albuterol sulfate HFA    dextrose    dextrose    glucagon (human recombinant)    hydrALAZINE    HYDROmorphone **AND** naloxone    labetalol    Morphine **AND** naloxone    OLANZapine (zyPREXA) 5 mg in sterile water (preservative free) 1 mL injection    ondansetron ODT **OR** ondansetron    Pharmacy to Dose Zosyn   Infusions  Pharmacy to Dose Zosyn,   sodium chloride, 100 mL/hr, Last Rate: 100 mL/hr (24 0046)          Diagnostic Data    Results from last  7 days   Lab Units 03/16/24  2309   WBC 10*3/mm3 11.62*   HEMOGLOBIN g/dL 13.0   HEMATOCRIT % 39.8   PLATELETS 10*3/mm3 181   GLUCOSE mg/dL 158*   CREATININE mg/dL 0.86   BUN mg/dL 9   SODIUM mmol/L 136   POTASSIUM mmol/L 4.3   AST (SGOT) U/L 30   ALT (SGPT) U/L 31   ALK PHOS U/L 74   BILIRUBIN mg/dL 1.2   ANION GAP mmol/L 14.0       XR Abdomen KUB    Result Date: 3/17/2024  The gastric tube is positioned in the stomach. Electronically Signed: Gonzales Teague MD  3/17/2024 9:23 AM EDT  Workstation ID: [id number]    XR Abdomen KUB    Result Date: 3/15/2024  Impression: 1.Nasogastric tube tip is within the stomach. 2.Nonspecific distention of visualized bowel that might relate to ileus and should be correlated with clinical findings. Electronically Signed: Enzo Chaparro MD  3/15/2024 12:51 PM EDT  Workstation ID: [id number]       I reviewed the patient's new clinical results.    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     # Cholelithiasis status post open subtotal cholecystectomy  -Patient is admitted for elective surgery.  Patient has symptomatic cholelithiasis.  -Patient close surgery laparoscopic cholecystectomy converted to open subtotal cholecystectomy on March 14.  -Management per general surgery-currently strict n.p.o. and continue IV fluids.  Patient has NG tube connected to low intermittent wall suction.  -As needed pain meds and as needed nausea meds.  -Patient has a JOSETTE drain, plan to strip and empty every shift and as needed.    -Continue IV antibiotics with IV Zosyn- white count improving.  Continue to monitor for now.    -Apparently GI was also consulted for possible ERCP however did not recommend ERCP at this time secondary to fresh surgery.  Recommend continuing to monitor JOSETTE  drain output.  Will ask for CBC and BMP today.     #Anxiety  -zyprexa as needed's ordered.     #COPD  -DuoNeb     #Diabetes mellitus  -  N.p.o. currently.  Started on SSI and once patient is able to eat     #Hypertension  -Hydralazine as needed is ordered.  Currently strict NPO.  Resume home medication once clinically appropriate    Hep C   Needs treatment per GI.              Nutrition: No diet orders on file            DVT prophylaxis:  Mechanical DVT prophylaxis orders are present.         Code status is   Code Status and Medical Interventions:   Ordered at: 03/14/24 7865     Code Status (Patient has no pulse and is not breathing):    CPR (Attempt to Resuscitate)     Medical Interventions (Patient has pulse or is breathing):    Full       Plan for disposition: Pending clinical course    Time: 30 minutes    Signature: Electronically signed by Judd Siegel MD, 03/17/24, 09:31 EDT.  [facility] Hospitalist Team

## 2024-03-17 NOTE — PROGRESS NOTES
General Surgery Progress Note    Name: Shaun Shanks ADMIT: 3/14/2024   : 1980  PCP: Diann Wallace MD    MRN: [id number] LOS: 3 days   AGE/SEX: 43 y.o. male  ROOM: [address] [place] [address]    No chief complaint on file.    Subjective     Patient seen and examined.  Hypertensive this morning, but otherwise vital signs stable and he is afebrile.  NG tube fell out this morning, but has since been replaced and placement has been confirmed.  Pain is controlled.  Currently n.p.o. with NG tube in place, denies nausea vomiting.  Not passing flatus, no BMs.  JOSETTE with 80 mL of recorded output over last 24 hours.  No NG output recorded.  WBC count relatively the same, 11.49 this morning.  Hemoglobin stable.    Objective     Scheduled Medications:   insulin lispro, 2-9 Units, Subcutaneous, Q6H  ipratropium-albuterol, 3 mL, Nebulization, 4x Daily - RT  pantoprazole, 40 mg, Intravenous, BID AC  piperacillin-tazobactam, 4.5 g, Intravenous, Q8H        Active Infusions:  Pharmacy to Dose Zosyn,   sodium chloride, 100 mL/hr, Last Rate: 100 mL/hr (24 0046)        As Needed Medications:    albuterol sulfate HFA    dextrose    dextrose    glucagon (human recombinant)    hydrALAZINE    HYDROmorphone **AND** naloxone    labetalol    Morphine **AND** naloxone    OLANZapine (zyPREXA) 5 mg in sterile water (preservative free) 1 mL injection    ondansetron ODT **OR** ondansetron    Pharmacy to Dose Zosyn    Vital Signs  Vital Signs Patient Vitals for the past 24 hrs:   BP Temp Temp src Pulse Resp SpO2   24 0611 (!) 156/103 -- -- -- -- --   24 0521 (!) 197/103 97.5 °F (36.4 °C) Oral 87 16 97 %   24 2100 166/98 98 °F (36.7 °C) Oral -- 16 --   24 -- -- -- 102 26 100 %   24 -- -- -- 97 22 99 %   24 1843 (!) 190/91 -- -- 98 -- --   24 1733 (!) 195/111 -- -- 98 -- --   24 1605 -- -- -- 93 18 100 %   24 1558 -- -- --  18 96 %   24 1145 -- -- --  16 100 %   24  1137 -- -- -- 98 16 96 %   03/16/24 1127 (!) 163/108 98 °F (36.7 °C) Oral -- 20 --     I/O:  I/O last 3 completed shifts:  In: -   Out: 2405 [Urine:2275; Drains:130]    Physical Exam:  Physical Exam  Constitutional:       General: He is not in acute distress.  Cardiovascular:      Rate and Rhythm: Normal rate.   Pulmonary:      Effort: Pulmonary effort is normal. No respiratory distress.   Abdominal:      General: There is no distension.      Palpations: Abdomen is soft.      Tenderness: There is abdominal tenderness. There is no guarding.      Comments: Soft, nondistended, appropriate tenderness.  Incision clean dry and intact.  JOSETTE x 1 present with serosanguineous output.  NG tube in place with brown-tinged output.   Neurological:      Mental Status: He is alert and oriented to person, place, and time.   Psychiatric:         Mood and Affect: Mood normal.         Behavior: Behavior normal.         Results Review:     CBC    Results from last 7 days   Lab Units 03/17/24  1003 03/16/24  2309 03/16/24  0105 03/15/24  0234   WBC 10*3/mm3 11.49* 11.62* 11.27* 18.00*   HEMOGLOBIN g/dL 13.5 13.0 13.8 14.3   PLATELETS 10*3/mm3 190 181 179 243     BMP   Results from last 7 days   Lab Units 03/17/24  1003 03/16/24  2309 03/16/24  0105 03/15/24  0234   SODIUM mmol/L 136 136 142 136   POTASSIUM mmol/L 4.3 4.3 4.9 4.9   CHLORIDE mmol/L 100 102 107 101   CO2 mmol/L 23.0 20.0* 23.0 24.0   BUN mg/dL 9 9 11 16   CREATININE mg/dL 0.83 0.86 1.01 1.04   GLUCOSE mg/dL 126* 158* 153* 142*   MAGNESIUM mg/dL  --  1.7 1.9  --    PHOSPHORUS mg/dL  --  3.0 2.3*  --      Radiology(recent) XR Abdomen KUB    Result Date: 3/17/2024  The gastric tube is positioned in the stomach. Electronically Signed: Gonzales Teauge MD  3/17/2024 9:23 AM EDT  Workstation ID: [id number]    XR Abdomen KUB    Result Date: 3/15/2024  Impression: 1.Nasogastric tube tip is within the stomach. 2.Nonspecific distention of visualized bowel that might relate to ileus and  should be correlated with clinical findings. Electronically Signed: Enzo Chaparro MD  3/15/2024 12:51 PM EDT  Workstation ID: [id number]     I reviewed the patient's new clinical results.    Assessment & Plan       Gallstones    Duodenal fistula    S/P cholecystectomy      43 y.o. male POD 3 status post laparoscopic cholecystectomy converted to open subtotal cholecystectomy with repair of duodenal fistula with modified Reilly patch     Strict NPO, continue IV fluids  Continue NG tube to low intermittent wall suction  Antiemetics and pain medication as needed  Increase mobilization as tolerated, out of bed to chair  Continue working with PT/OT  Use incentive spirometer bedside  Continue JOSETTE drain, strip and empty every shift and as needed  Monitor quality of JOSETTE drainage for signs of bile leak  Continue IV antibiotics  Planning for upper GI tomorrow        This note was created using Dragon Voice Recognition software.    WILLIAM Vasquez  03/17/24  10:41 EDT

## 2024-03-17 NOTE — PROGRESS NOTES
" LOS: 3 days   Patient Care Team:  Diann Wallace MD as PCP - General (Family Medicine)  Antoine Ivey MD as Surgeon (General Surgery)      Subjective   \" Pain is a 5.5 out of 10.\"    Interval History:    Labs: CMP unremarkable.  WBCs 11.62, hemoglobin stable at 13, platelets 181.  Plan is for upper GI on Monday to evaluate for bile leak prior to removing NG tube.  Patient has a total of 600 cc in his NG tube canister.  This is a continuous total as it is not being emptied.  Patient states IV pain medications are adequate.  Patient is passing gas has not had a bowel movement.    ROS:   Abdominal pain  No chest pain, shortness of breath, or cough.         Medication Review:     Current Facility-Administered Medications:     albuterol sulfate HFA (PROVENTIL HFA;VENTOLIN HFA;PROAIR HFA) inhaler 2 puff, 2 puff, Inhalation, Q4H PRN, Antoine Ivey MD    dextrose (D50W) (25 g/50 mL) IV injection 25 g, 25 g, Intravenous, Q15 Min PRN, Sanjeev Huizara, APRN    dextrose (GLUTOSE) oral gel 15 g, 15 g, Oral, Q15 Min PRN, Sanjeev Huizara, APRN    glucagon (GLUCAGEN) injection 1 mg, 1 mg, Intramuscular, Q15 Min PRN, HuizarDonyJuliet, APRN    hydrALAZINE (APRESOLINE) injection 10 mg, 10 mg, Intravenous, Q6H PRN, Shira Cadena MD, 10 mg at 03/16/24 7063    HYDROmorphone (DILAUDID) injection 0.5 mg, 0.5 mg, Intravenous, Q2H PRN, 0.5 mg at 03/17/24 0526 **AND** naloxone (NARCAN) injection 0.1 mg, 0.1 mg, Intravenous, Q5 Min PRN, Antoine Ivey MD    insulin lispro (HUMALOG/ADMELOG) injection 2-9 Units, 2-9 Units, Subcutaneous, Q6H, Sanjeev Huizara, APRN, 2 Units at 03/15/24 1216    ipratropium-albuterol (DUO-NEB) nebulizer solution 3 mL, 3 mL, Nebulization, 4x Daily - RT, Greg Nevarez MD, 3 mL at 03/16/24 2003    labetalol (NORMODYNE,TRANDATE) injection 10 mg, 10 mg, Intravenous, Q6H PRN, Shira Cadena MD, 10 mg at 03/17/24 0526    midazolam (VERSED) injection 0.5 mg, 0.5 mg, Intravenous, Once, Surinder, " MD Shira    morphine injection 4 mg, 4 mg, Intravenous, Q2H PRN **AND** naloxone (NARCAN) injection 0.4 mg, 0.4 mg, Intravenous, Q5 Min PRN, Antoine Ivey MD    OLANZapine (zyPREXA) 5 mg in sterile water (preservative free) 1 mL injection, 5 mg, Intramuscular, Q8H PRN, Greg Nevarez MD, 5 mg at 03/15/24 0153    ondansetron ODT (ZOFRAN-ODT) disintegrating tablet 4 mg, 4 mg, Oral, Q6H PRN **OR** ondansetron (ZOFRAN) injection 4 mg, 4 mg, Intravenous, Q6H PRN, Antoine Ivey MD    pantoprazole (PROTONIX) injection 40 mg, 40 mg, Intravenous, BID Ty LINARES Donna Ann, APRN, 40 mg at 03/17/24 0800    Pharmacy to Dose Zosyn, , Does not apply, Continuous PRN, Antoine Ivey MD    piperacillin-tazobactam (ZOSYN) 4.5 g IVPB in 100 mL NS MBP (CD), 4.5 g, Intravenous, Q8H, Antoine Ivey MD, Currently Infusing at 03/17/24 0801    sodium chloride 0.9 % infusion, 100 mL/hr, Intravenous, Continuous, Antoine Ivey MD, Last Rate: 100 mL/hr at 03/17/24 0046, 100 mL/hr at 03/17/24 0046      Objective resting in the hospital bed.  NAD.  No family present.  Continued tremor/tics noted.    Vital Signs  Temp:  [97.5 °F (36.4 °C)-98 °F (36.7 °C)] 97.5 °F (36.4 °C)  Heart Rate:  [] 87  Resp:  [16-26] 16  BP: (156-197)/() 156/103  Physical Exam:    General Appearance:    Awake and alert, in no acute distress   Head:    Normocephalic, without obvious abnormality   Eyes:          Conjunctivae normal, anicteric sclerae   Ears:    Hearing intact   Throat:   No oral lesions, no thrush, oral mucosa moist   Neck:   No adenopathy, supple, no JVD   Lungs:     respirations regular, even and unlabored       Abdomen:     soft, non-tender, no rebound or guarding, non-distended, no hepatosplenomegaly.  JOSETTE drain noted in right side of abdomen with about 20 cc of serosanguineous fluid.   Rectal:     Deferred   Extremities:   No edema, no cyanosis, no redness   Skin:   No bleeding, bruising or  "rash, no jaundice   Neurologic:   Cranial nerves 2 - 12 grossly intact, no asterixis, sensation   intact        Results Review:    CBC    Results from last 7 days   Lab Units 03/16/24 2309 03/16/24  0105 03/15/24  0234   WBC 10*3/mm3 11.62* 11.27* 18.00*   HEMOGLOBIN g/dL 13.0 13.8 14.3   PLATELETS 10*3/mm3 181 179 243     CMP   Results from last 7 days   Lab Units 03/16/24 2309 03/16/24  0105 03/15/24  0234   SODIUM mmol/L 136 142 136   POTASSIUM mmol/L 4.3 4.9 4.9   CHLORIDE mmol/L 102 107 101   CO2 mmol/L 20.0* 23.0 24.0   BUN mg/dL 9 11 16   CREATININE mg/dL 0.86 1.01 1.04   GLUCOSE mg/dL 158* 153* 142*   ALBUMIN g/dL 3.8 4.0 4.2   BILIRUBIN mg/dL 1.2 1.1 0.8   ALK PHOS U/L 74 84 94   AST (SGOT) U/L 30 36 43*   ALT (SGPT) U/L 31 40 51*   MAGNESIUM mg/dL 1.7 1.9  --    PHOSPHORUS mg/dL 3.0 2.3*  --      Cr Clearance Estimated Creatinine Clearance: 152 mL/min (by C-G formula based on SCr of 0.86 mg/dL).  Coag     HbA1C No results found for: \"HGBA1C\"  Blood Glucose   Glucose   Date/Time Value Ref Range Status   03/17/2024 0527 137 (H) 70 - 105 mg/dL Final     Comment:     Serial Number: [id number]:  [id number]   03/17/2024 0050 136 (H) 70 - 105 mg/dL Final     Comment:     Serial Number: [id number]:  [id number]   03/16/2024 1701 122 (H) 70 - 105 mg/dL Final     Comment:     Serial Number: [id number]:  [id number]   03/16/2024 1127 143 (H) 70 - 105 mg/dL Final     Comment:     Serial Number: [id number]:  [id number]   03/16/2024 0535 146 (H) 70 - 105 mg/dL Final     Comment:     Serial Number: [id number]:  [id number]   03/16/2024 0209 157 (H) 70 - 105 mg/dL Final     Comment:     Serial Number: [id number]:  [id number]   03/15/2024 1740 130 (H) 70 - 105 mg/dL Final     Comment:     Serial Number: [id number]:  [id number]   03/15/2024 1154 157 (H) 70 - 105 mg/dL Final     Comment:     Serial Number: [id number]:  [id number]     Infection   Results from last 7 days   Lab " Units 03/15/24  0234   PROCALCITONIN ng/mL 0.66*     UA      Radiology(recent) XR Abdomen KUB    Result Date: 3/15/2024  Impression: 1.Nasogastric tube tip is within the stomach. 2.Nonspecific distention of visualized bowel that might relate to ileus and should be correlated with clinical findings. Electronically Signed: Enzo Chaparro MD  3/15/2024 12:51 PM EDT  Workstation ID: [id number]         Assessment & Plan   Patient is a 43-year-old male who came in for an elective cholecystectomy due to gallstones and ended up having an open subtotal cholecystectomy.  Had a duodenal fistula which was repaired with a Reilly patch all on 3/14/2024.  GI was consulted for possible ERCP.     Gallstones status post subtotal cholecystectomy 3/14/2024  Duodenal fistula with Reilly patch repair 3/14/2024  Chronic hepatitis C previously treated and cured with Epclusa now on Vosevi  Hepatitis B core antibody positive  COPD  History of DVT  Diabetes  Anxiety  Hypertension  Elevated LFTs consider related to hepatitis as well as gallstones  Leukocytosis    PLAN:   Total of 95 cc on 3/15/2024, 80 cc on 3/16/2024 from JOSETTE drain.  Okay with Protonix due to history of Reilly patch while on Vosevi.  Patient had 235 cc of serosanguineous drainage on 3/14/2023, 95 cc so far today.   Pain is pretty well controlled on IV pain meds.   Will give a dose of subcu Relistor as patient is getting IV pain medications.  He is passing gas but has not had a bowel movement yet.  We will follow.   Continue Hep C treatment. Hold PPI while on Hep C treatment.   Patient is on Zosyn.      Trixie Crawford, APRN  03/17/24  08:41 EDT

## 2024-03-17 NOTE — PLAN OF CARE
Goal Outcome Evaluation: Upon change in shift, patient resting in bed. Remains on NG tube and strict NPO. Minimal complaints of pain, see MAR for interventions. Monitoring BP this shift and it has been controlled this shift. Patient is to have Upper GI on 03/18/24.

## 2024-03-17 NOTE — PLAN OF CARE
Goal Outcome Evaluation:    Patient able to make needs known. NGT to LIWS. Remains hypertensive, treating per MAR.

## 2024-03-18 ENCOUNTER — APPOINTMENT (OUTPATIENT)
Dept: GENERAL RADIOLOGY | Facility: HOSPITAL | Age: 44
DRG: 330 | End: 2024-03-18
Payer: MEDICARE

## 2024-03-18 ENCOUNTER — INPATIENT HOSPITAL (OUTPATIENT)
Dept: URBAN - METROPOLITAN AREA HOSPITAL 84 | Facility: HOSPITAL | Age: 44
End: 2024-03-18
Payer: MEDICAID

## 2024-03-18 DIAGNOSIS — B19.10 UNSPECIFIED VIRAL HEPATITIS B WITHOUT HEPATIC COMA: ICD-10-CM

## 2024-03-18 DIAGNOSIS — Z90.49 ACQUIRED ABSENCE OF OTHER SPECIFIED PARTS OF DIGESTIVE TRACT: ICD-10-CM

## 2024-03-18 DIAGNOSIS — K80.43 CALCULUS OF BILE DUCT WITH ACUTE CHOLECYSTITIS WITH OBSTRUCT: ICD-10-CM

## 2024-03-18 DIAGNOSIS — K31.6 FISTULA OF STOMACH AND DUODENUM: ICD-10-CM

## 2024-03-18 DIAGNOSIS — B18.2 CHRONIC VIRAL HEPATITIS C: ICD-10-CM

## 2024-03-18 DIAGNOSIS — R74.01 ELEVATION OF LEVELS OF LIVER TRANSAMINASE LEVELS: ICD-10-CM

## 2024-03-18 LAB
ALBUMIN SERPL-MCNC: 3.7 G/DL (ref 3.5–5.2)
ALBUMIN/GLOB SERPL: 1.2 G/DL
ALP SERPL-CCNC: 86 U/L (ref 39–117)
ALT SERPL W P-5'-P-CCNC: 24 U/L (ref 1–41)
ANION GAP SERPL CALCULATED.3IONS-SCNC: 14 MMOL/L (ref 5–15)
AST SERPL-CCNC: 29 U/L (ref 1–40)
BASOPHILS # BLD AUTO: 0.04 10*3/MM3 (ref 0–0.2)
BASOPHILS NFR BLD AUTO: 0.4 % (ref 0–1.5)
BILIRUB SERPL-MCNC: 1.1 MG/DL (ref 0–1.2)
BUN SERPL-MCNC: 9 MG/DL (ref 6–20)
BUN/CREAT SERPL: 10.3 (ref 7–25)
CALCIUM SPEC-SCNC: 9 MG/DL (ref 8.6–10.5)
CHLORIDE SERPL-SCNC: 102 MMOL/L (ref 98–107)
CO2 SERPL-SCNC: 22 MMOL/L (ref 22–29)
CREAT SERPL-MCNC: 0.87 MG/DL (ref 0.76–1.27)
DEPRECATED RDW RBC AUTO: 43.7 FL (ref 37–54)
EGFRCR SERPLBLD CKD-EPI 2021: 108.4 ML/MIN/1.73
EOSINOPHIL # BLD AUTO: 0.34 10*3/MM3 (ref 0–0.4)
EOSINOPHIL NFR BLD AUTO: 3.6 % (ref 0.3–6.2)
ERYTHROCYTE [DISTWIDTH] IN BLOOD BY AUTOMATED COUNT: 12.9 % (ref 12.3–15.4)
GLOBULIN UR ELPH-MCNC: 3.2 GM/DL
GLUCOSE BLDC GLUCOMTR-MCNC: 112 MG/DL (ref 70–105)
GLUCOSE BLDC GLUCOMTR-MCNC: 118 MG/DL (ref 70–105)
GLUCOSE BLDC GLUCOMTR-MCNC: 120 MG/DL (ref 70–105)
GLUCOSE BLDC GLUCOMTR-MCNC: 125 MG/DL (ref 70–105)
GLUCOSE BLDC GLUCOMTR-MCNC: 132 MG/DL (ref 70–105)
GLUCOSE SERPL-MCNC: 125 MG/DL (ref 65–99)
HCT VFR BLD AUTO: 39.1 % (ref 37.5–51)
HGB BLD-MCNC: 13 G/DL (ref 13–17.7)
IMM GRANULOCYTES # BLD AUTO: 0.05 10*3/MM3 (ref 0–0.05)
IMM GRANULOCYTES NFR BLD AUTO: 0.5 % (ref 0–0.5)
LYMPHOCYTES # BLD AUTO: 1.53 10*3/MM3 (ref 0.7–3.1)
LYMPHOCYTES NFR BLD AUTO: 16.1 % (ref 19.6–45.3)
MCH RBC QN AUTO: 30.8 PG (ref 26.6–33)
MCHC RBC AUTO-ENTMCNC: 33.2 G/DL (ref 31.5–35.7)
MCV RBC AUTO: 92.7 FL (ref 79–97)
MONOCYTES # BLD AUTO: 0.77 10*3/MM3 (ref 0.1–0.9)
MONOCYTES NFR BLD AUTO: 8.1 % (ref 5–12)
NEUTROPHILS NFR BLD AUTO: 6.8 10*3/MM3 (ref 1.7–7)
NEUTROPHILS NFR BLD AUTO: 71.3 % (ref 42.7–76)
NRBC BLD AUTO-RTO: 0 /100 WBC (ref 0–0.2)
PLATELET # BLD AUTO: 199 10*3/MM3 (ref 140–450)
PMV BLD AUTO: 10.4 FL (ref 6–12)
POTASSIUM SERPL-SCNC: 4.5 MMOL/L (ref 3.5–5.2)
PROT SERPL-MCNC: 6.9 G/DL (ref 6–8.5)
RBC # BLD AUTO: 4.22 10*6/MM3 (ref 4.14–5.8)
SODIUM SERPL-SCNC: 138 MMOL/L (ref 136–145)
WBC NRBC COR # BLD AUTO: 9.53 10*3/MM3 (ref 3.4–10.8)

## 2024-03-18 PROCEDURE — 25510000001 IOPAMIDOL PER 1 ML: Performed by: HOSPITALIST

## 2024-03-18 PROCEDURE — 85025 COMPLETE CBC W/AUTO DIFF WBC: CPT | Performed by: HOSPITALIST

## 2024-03-18 PROCEDURE — 25010000002 PIPERACILLIN SOD-TAZOBACTAM PER 1 G: Performed by: HOSPITALIST

## 2024-03-18 PROCEDURE — 97116 GAIT TRAINING THERAPY: CPT

## 2024-03-18 PROCEDURE — 94799 UNLISTED PULMONARY SVC/PX: CPT

## 2024-03-18 PROCEDURE — 94760 N-INVAS EAR/PLS OXIMETRY 1: CPT

## 2024-03-18 PROCEDURE — 82948 REAGENT STRIP/BLOOD GLUCOSE: CPT | Performed by: NURSE PRACTITIONER

## 2024-03-18 PROCEDURE — 94664 DEMO&/EVAL PT USE INHALER: CPT

## 2024-03-18 PROCEDURE — 74240 X-RAY XM UPR GI TRC 1CNTRST: CPT

## 2024-03-18 PROCEDURE — 99024 POSTOP FOLLOW-UP VISIT: CPT | Performed by: SURGERY

## 2024-03-18 PROCEDURE — 25010000002 PIPERACILLIN SOD-TAZOBACTAM PER 1 G: Performed by: SURGERY

## 2024-03-18 PROCEDURE — 25810000003 SODIUM CHLORIDE 0.9 % SOLUTION: Performed by: SURGERY

## 2024-03-18 PROCEDURE — 82948 REAGENT STRIP/BLOOD GLUCOSE: CPT

## 2024-03-18 PROCEDURE — 94761 N-INVAS EAR/PLS OXIMETRY MLT: CPT

## 2024-03-18 PROCEDURE — 80053 COMPREHEN METABOLIC PANEL: CPT | Performed by: HOSPITALIST

## 2024-03-18 PROCEDURE — 99232 SBSQ HOSP IP/OBS MODERATE 35: CPT | Performed by: NURSE PRACTITIONER

## 2024-03-18 PROCEDURE — 25010000002 MORPHINE PER 10 MG: Performed by: SURGERY

## 2024-03-18 RX ORDER — FINERENONE 10 MG/1
1 TABLET, FILM COATED ORAL DAILY
COMMUNITY

## 2024-03-18 RX ORDER — FEXOFENADINE HCL 180 MG/1
180 TABLET ORAL DAILY
COMMUNITY

## 2024-03-18 RX ORDER — HYDROCODONE BITARTRATE AND ACETAMINOPHEN 5; 325 MG/1; MG/1
2 TABLET ORAL EVERY 4 HOURS PRN
Status: DISCONTINUED | OUTPATIENT
Start: 2024-03-18 | End: 2024-03-20 | Stop reason: HOSPADM

## 2024-03-18 RX ORDER — AZELASTINE 1 MG/ML
1 SPRAY, METERED NASAL 2 TIMES DAILY
COMMUNITY

## 2024-03-18 RX ORDER — SOFOSBUVIR, VELPATASVIR, AND VOXILAPREVIR 400; 100; 100 MG/1; MG/1; MG/1
1 TABLET, FILM COATED ORAL DAILY
COMMUNITY

## 2024-03-18 RX ORDER — DAPAGLIFLOZIN AND SAXAGLIPTIN 10; 5 MG/1; MG/1
1 TABLET, FILM COATED ORAL DAILY
COMMUNITY

## 2024-03-18 RX ORDER — HYDROCODONE BITARTRATE AND ACETAMINOPHEN 5; 325 MG/1; MG/1
1 TABLET ORAL EVERY 4 HOURS PRN
Status: DISCONTINUED | OUTPATIENT
Start: 2024-03-18 | End: 2024-03-20 | Stop reason: HOSPADM

## 2024-03-18 RX ADMIN — SODIUM CHLORIDE 100 ML/HR: 9 INJECTION, SOLUTION INTRAVENOUS at 21:05

## 2024-03-18 RX ADMIN — PIPERACILLIN AND TAZOBACTAM 4.5 G: 4; .5 INJECTION, POWDER, FOR SOLUTION INTRAVENOUS at 01:48

## 2024-03-18 RX ADMIN — IPRATROPIUM BROMIDE AND ALBUTEROL SULFATE 3 ML: .5; 3 SOLUTION RESPIRATORY (INHALATION) at 07:45

## 2024-03-18 RX ADMIN — PIPERACILLIN AND TAZOBACTAM 4.5 G: 4; .5 INJECTION, POWDER, FOR SOLUTION INTRAVENOUS at 08:30

## 2024-03-18 RX ADMIN — SODIUM CHLORIDE 100 ML/HR: 9 INJECTION, SOLUTION INTRAVENOUS at 01:49

## 2024-03-18 RX ADMIN — IPRATROPIUM BROMIDE AND ALBUTEROL SULFATE 3 ML: .5; 3 SOLUTION RESPIRATORY (INHALATION) at 15:50

## 2024-03-18 RX ADMIN — PANTOPRAZOLE SODIUM 40 MG: 40 INJECTION, POWDER, FOR SOLUTION INTRAVENOUS at 08:16

## 2024-03-18 RX ADMIN — MORPHINE SULFATE 4 MG: 4 INJECTION, SOLUTION INTRAMUSCULAR; INTRAVENOUS at 08:23

## 2024-03-18 RX ADMIN — HYDROCODONE BITARTRATE AND ACETAMINOPHEN 1 TABLET: 5; 325 TABLET ORAL at 13:53

## 2024-03-18 RX ADMIN — PANTOPRAZOLE SODIUM 40 MG: 40 INJECTION, POWDER, FOR SOLUTION INTRAVENOUS at 18:01

## 2024-03-18 RX ADMIN — HYDROCODONE BITARTRATE AND ACETAMINOPHEN 2 TABLET: 5; 325 TABLET ORAL at 21:02

## 2024-03-18 RX ADMIN — IPRATROPIUM BROMIDE AND ALBUTEROL SULFATE 3 ML: .5; 3 SOLUTION RESPIRATORY (INHALATION) at 19:22

## 2024-03-18 RX ADMIN — IOPAMIDOL 100 ML: 755 INJECTION, SOLUTION INTRAVENOUS at 10:46

## 2024-03-18 RX ADMIN — PIPERACILLIN AND TAZOBACTAM 4.5 G: 4; .5 INJECTION, POWDER, FOR SOLUTION INTRAVENOUS at 18:00

## 2024-03-18 NOTE — NURSING NOTE
"Patient removed 2nd NG, became upset when discussion about replacing. Patient refused NG and stated \"I will leave\". Explained to patient need to stay and at least speak to MD in am.  "

## 2024-03-18 NOTE — PROGRESS NOTES
General Surgery Progress Note    Name: Shaun Shanks ADMIT: 3/14/2024   : 1980  PCP: Diann Wallace MD    MRN: [id number] LOS: 4 days   AGE/SEX: 43 y.o. male  ROOM: [address] [place] [address]    No chief complaint on file.    Subjective     Patient seen and examined.  Vital signs stable, afebrile.  NG tube #2 fell out overnight, patient refused to replace multiple times.  Reports feeling the best he has in the last few days.  States pain is controlled.  Currently NPO, denies nausea and vomiting.  Has passed gas, no BM.  Has been ambulating around room and with medical staff.  White count is now normal, hemoglobin stable.  JOSETTE with 30 mL output x 24 hours.    Objective     Scheduled Medications:   insulin lispro, 2-9 Units, Subcutaneous, Q6H  ipratropium-albuterol, 3 mL, Nebulization, 4x Daily - RT  pantoprazole, 40 mg, Intravenous, BID AC  piperacillin-tazobactam, 4.5 g, Intravenous, Q8H        Active Infusions:  Pharmacy to Dose Zosyn,   sodium chloride, 100 mL/hr, Last Rate: 100 mL/hr (24 0149)        As Needed Medications:    albuterol sulfate HFA    dextrose    dextrose    glucagon (human recombinant)    hydrALAZINE    HYDROmorphone **AND** naloxone    labetalol    Morphine **AND** naloxone    OLANZapine (zyPREXA) 5 mg in sterile water (preservative free) 1 mL injection    ondansetron ODT **OR** ondansetron    Pharmacy to Dose Zosyn    Vital Signs  Vital Signs Patient Vitals for the past 24 hrs:   BP Temp Temp src Pulse Resp SpO2   24 0747 -- -- -- 86 16 95 %   24 0745 -- -- -- 100 16 95 %   24 0539 154/92 97.4 °F (36.3 °C) Oral 101 16 97 %   24 2044 134/93 98 °F (36.7 °C) Oral 110 16 93 %   24 195 -- -- -- 87 18 97 %   24 1950 -- -- -- 89 16 95 %   24 1630 99/70 98.1 °F (36.7 °C) Oral -- -- --   24 1622 -- -- -- 86 18 100 %   24 1619 -- -- -- 91 18 97 %   24 1252 (!) 155/108 98.4 °F (36.9 °C) Oral -- -- --   24 1200 -- -- -- 83 19 100 %    03/17/24 1155 -- -- -- 88 19 94 %   03/17/24 1145 (!) 172/105 -- -- -- -- --     I/O:  I/O last 3 completed shifts:  In: -   Out: 1020 [Urine:950; Drains:70]    Physical Exam:  Physical Exam  Constitutional:       General: He is not in acute distress.  Cardiovascular:      Rate and Rhythm: Normal rate.   Pulmonary:      Effort: Pulmonary effort is normal. No respiratory distress.   Abdominal:      Palpations: Abdomen is soft.      Comments: Soft, nondistended, appropriate tenderness.  Incision covered with fatou dressing with old saturation noted.  JOSETTE x 1 present with serosanguineous output.   Neurological:      Mental Status: He is alert and oriented to person, place, and time.   Psychiatric:         Mood and Affect: Mood normal.         Behavior: Behavior normal.         Results Review:     CBC    Results from last 7 days   Lab Units 03/18/24  0717 03/17/24  1003 03/16/24  2309 03/16/24  0105 03/15/24  0234   WBC 10*3/mm3 9.53 11.49* 11.62* 11.27* 18.00*   HEMOGLOBIN g/dL 13.0 13.5 13.0 13.8 14.3   PLATELETS 10*3/mm3 199 190 181 179 243     BMP   Results from last 7 days   Lab Units 03/18/24  0717 03/17/24  1003 03/16/24  2309 03/16/24  0105 03/15/24  0234   SODIUM mmol/L 138 136 136 142 136   POTASSIUM mmol/L 4.5 4.3 4.3 4.9 4.9   CHLORIDE mmol/L 102 100 102 107 101   CO2 mmol/L 22.0 23.0 20.0* 23.0 24.0   BUN mg/dL 9 9 9 11 16   CREATININE mg/dL 0.87 0.83 0.86 1.01 1.04   GLUCOSE mg/dL 125* 126* 158* 153* 142*   MAGNESIUM mg/dL  --   --  1.7 1.9  --    PHOSPHORUS mg/dL  --   --  3.0 2.3*  --      Radiology(recent) XR Abdomen KUB    Result Date: 3/17/2024  The gastric tube is positioned in the stomach. Electronically Signed: Gonzales Teague MD  3/17/2024 9:23 AM EDT  Workstation ID: [id number]     I reviewed the patient's new clinical results.    Assessment & Plan       Gallstones    Duodenal fistula    S/P cholecystectomy      43 y.o. male POD 4 status post laparoscopic cholecystectomy converted to open subtotal  cholecystectomy with repair of duodenal fistula with modified Reilly patch     Strict NPO, continue IV fluids  Antiemetics and pain medication as needed  Continue to ambulate, out of bed to chair  Continue working with PT/OT  Use incentive spirometer bedside  Continue JOSETTE drain, strip and empty every shift and as needed  Monitor quality of JOSETTE drainage for signs of bile leak  Continue IV antibiotics  Planning for upper GI this morning, will await results        This note was created using Dragon Voice Recognition software.    WILLIAM Vasquez  03/18/24  10:32 EDT

## 2024-03-18 NOTE — CASE MANAGEMENT/SOCIAL WORK
Continued Stay Note  [facility]     Patient Name: Shaun Shanks  MRN: [id number]  Today's Date: 3/18/2024    Admit Date: 3/14/2024    Plan: D/C PLan: Home. Mom can transport at d/c.   Discharge Plan       Row Name 03/18/24 1244       Plan    Plan D/C PLan: Home. Mom can transport at d/c.    Provided Post Acute Provider List? N/A    Provided Post Acute Provider Quality & Resource List? N/A    Plan Comments IMM reviewed with the patient by CM and a copy was left with the patient. D/C barriers: NG tube (per MD), IV medications, IVF, 3/18 [place] upper GI.               Expected Discharge Date and Time       Expected Discharge Date Expected Discharge Time    Mar 19, 2024           Edna Fonseca RN     [address] [address]  Phone: [phone]  Fax: [phone]

## 2024-03-18 NOTE — NURSING NOTE
"CNA came out and informed that pt NG tube was out. Went in to to discuss reinserting NG. Pt refused stated that \"I will just leaving \" Spoke with pt along with charge nurse and encouraged pt to stay til in morning when seen by MD. Pt left alone to settle down. Call was placed to Dr Floyd on call for gastro. Awaiting return call  "

## 2024-03-18 NOTE — PLAN OF CARE
Goal Outcome Evaluation:  Plan of Care Reviewed With: patient        Progress: improving  Outcome Evaluation: Pt up ad tonio in room. Pt had procedure today, diet advanced to clears tolerating well, no nausea or vomiting. OLEKSANDR in place, continues IVFs and antibiotics. Pt had BM today. VSS, oral pain medication given PRN per MAR, no concerns at this time.

## 2024-03-18 NOTE — PLAN OF CARE
Assessment: Shaun Shanks presents with functional mobility impairments which indicate the need for skilled intervention. Tolerating session today without incident.  Pt with significant improvement in mobility this date ambualting 400 feet without assistance. Pt does not require continued therapy at this time as he is safe to mobilize alone or with nursing staff.

## 2024-03-18 NOTE — PLAN OF CARE
Goal Outcome Evaluation: Pt remains on IV abx. Remains NPO at this time. Pt refused again when asked to replace NGT but refused, Plan of care ongoing

## 2024-03-18 NOTE — PROGRESS NOTES
[facility] MEDICINE SERVICE  DAILY PROGRESS NOTE    NAME: Shaun Shanks  : 1980  MRN: [id number]      LOS: 4 days     PROVIDER OF SERVICE: Shira Cadena MD    Chief Complaint: Gallstones    Subjective:     Interval History: Patient seen and examined, seems somewhat anxious, Versed IV ordered.  He is n.p.o. currently.    3/16 pt seen and examined, he remains n.p.o. and continues to have an NG tube in.  Feeling somewhat better as compared to yesterday.      3/17  Pt NPO, noted to have NG tube in place. Denies for any nausea, vomiting or diarrhea.    3/18 patient seen and examined, NG tube has now been removed, clear liquid diet started.    Review of Systems:   Review of Systems  10 point ROS is negative other than what is stated positive above  Objective:     Vital Signs  Temp:  [97.3 °F (36.3 °C)-98.1 °F (36.7 °C)] 97.3 °F (36.3 °C)  Heart Rate:  [] 129  Resp:  [16-19] 19  BP: ()/() 159/103   Body mass index is 37.34 kg/m².    Physical Exam  Physical Exam  Awake alert obese.  Seems anxious, NG tube present  HEENT normocephalic atraumatic  Chest clear to auscultation bilaterally  CVs S1-S2 normal, regular in rhythm  Abdomen soft no tenderness.  JOSETTE drain present.  Site looks clean.  Bowel sounds hypoactive  Extremities warm to touch pulses no edema    Scheduled Meds   insulin lispro, 2-9 Units, Subcutaneous, Q6H  ipratropium-albuterol, 3 mL, Nebulization, 4x Daily - RT  methylnaltrexone, 12 mg, Subcutaneous, Once  pantoprazole, 40 mg, Intravenous, BID AC  piperacillin-tazobactam, 4.5 g, Intravenous, Q8H       PRN Meds     albuterol sulfate HFA    dextrose    dextrose    glucagon (human recombinant)    hydrALAZINE    HYDROcodone-acetaminophen **OR** HYDROcodone-acetaminophen    HYDROmorphone **AND** naloxone    labetalol    Morphine **AND** naloxone    OLANZapine (zyPREXA) 5 mg in sterile water (preservative free) 1 mL injection    ondansetron ODT **OR** ondansetron    Pharmacy to Dose  Zosyn   Infusions  Pharmacy to Dose Zosyn,   sodium chloride, 100 mL/hr, Last Rate: 100 mL/hr (03/18/24 0149)          Diagnostic Data    Results from last 7 days   Lab Units 03/18/24  0717   WBC 10*3/mm3 9.53   HEMOGLOBIN g/dL 13.0   HEMATOCRIT % 39.1   PLATELETS 10*3/mm3 199   GLUCOSE mg/dL 125*   CREATININE mg/dL 0.87   BUN mg/dL 9   SODIUM mmol/L 138   POTASSIUM mmol/L 4.5   AST (SGOT) U/L 29   ALT (SGPT) U/L 24   ALK PHOS U/L 86   BILIRUBIN mg/dL 1.1   ANION GAP mmol/L 14.0       FL Upper GI Water Soluble    Result Date: 3/18/2024  Impression: No evidence of gastroduodenal leak. Electronically Signed: Nando Genao MD  3/18/2024 11:10 AM EDT  Workstation ID: [id number]    XR Abdomen KUB    Result Date: 3/17/2024  The gastric tube is positioned in the stomach. Electronically Signed: Gonzales Teague MD  3/17/2024 9:23 AM EDT  Workstation ID: [id number]       I reviewed the patient's new clinical results.    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     # Cholelithiasis status post open subtotal cholecystectomy  -Patient is admitted for elective surgery.  Patient has symptomatic cholelithiasis.  -Patient close surgery laparoscopic cholecystectomy converted to open subtotal cholecystectomy on March 14.  -Management per general surgery-currently strict n.p.o. and continue IV fluids.  Patient has NG tube connected to low intermittent wall suction.  -As needed pain meds and as needed nausea meds.  -Patient has a JOSETTE drain, plan to strip and empty every shift and as needed.    -Continue IV antibiotics with IV Zosyn- white count improving.  Continue to monitor for now.    -Apparently GI was also consulted for possible ERCP however did not recommend ERCP at this time secondary to fresh surgery.   However they are available for ERCP, likely plan for tomorrow?  Unclear at this time.  -NG tube has now been removed and patient has been started on clears.    #Anxiety  -zyprexa as needed's ordered.     #COPD  -DuoNeb     #Diabetes mellitus  -  N.p.o. currently.  Started on SSI and once patient is able to eat     #Hypertension  -Hydralazine as needed is ordered.  Currently strict NPO.  Resume home medication once clinically appropriate    Hep C   Needs treatment per GI.              Nutrition: No diet orders on file            DVT prophylaxis:  Mechanical DVT prophylaxis orders are present.         Code status is   Code Status and Medical Interventions:   Ordered at: 03/14/24 4811     Code Status (Patient has no pulse and is not breathing):    CPR (Attempt to Resuscitate)     Medical Interventions (Patient has pulse or is breathing):    Full       Plan for disposition: Pending clinical course    Time: 30 minutes    Signature: Electronically signed by Shira Cadena MD, 03/18/24, 13:25 EDT.  [facility] Hospitalist Team

## 2024-03-18 NOTE — PROGRESS NOTES
LOS: 4 days   Patient Care Team:  Diann Wallace MD as PCP - General (Family Medicine)  Antoine Ivey MD as Surgeon (General Surgery)      Subjective     Interval History:     Subjective: Patient reports that he is feeling okay today.  NG was discontinued last night.  Denies any nausea/vomiting.  Reports generalized abdominal soreness.  No bowel movement overnight.      ROS:   No chest pain, shortness of breath, or cough.        Medication Review:     Current Facility-Administered Medications:     albuterol sulfate HFA (PROVENTIL HFA;VENTOLIN HFA;PROAIR HFA) inhaler 2 puff, 2 puff, Inhalation, Q4H PRN, Antoine Ivey MD    dextrose (D50W) (25 g/50 mL) IV injection 25 g, 25 g, Intravenous, Q15 Min PRN, Juliet Huizar APRN    dextrose (GLUTOSE) oral gel 15 g, 15 g, Oral, Q15 Min PRN, Juliet Huizar APRSEGUN    glucagon (GLUCAGEN) injection 1 mg, 1 mg, Intramuscular, Q15 Min PRN, Juliet Huizar, APRN    hydrALAZINE (APRESOLINE) injection 10 mg, 10 mg, Intravenous, Q6H PRN, Shira Cadena MD, 10 mg at 03/16/24 1733    HYDROcodone-acetaminophen (NORCO) 5-325 MG per tablet 1 tablet, 1 tablet, Oral, Q4H PRN **OR** HYDROcodone-acetaminophen (NORCO) 5-325 MG per tablet 2 tablet, 2 tablet, Oral, Q4H PRN, Antoine Ivey MD    HYDROmorphone (DILAUDID) injection 0.5 mg, 0.5 mg, Intravenous, Q2H PRN, 0.5 mg at 03/17/24 2050 **AND** naloxone (NARCAN) injection 0.1 mg, 0.1 mg, Intravenous, Q5 Min PRN, Antoine Ivey MD    insulin lispro (HUMALOG/ADMELOG) injection 2-9 Units, 2-9 Units, Subcutaneous, Q6H, Juliet Huizar, APRN, 2 Units at 03/15/24 1216    ipratropium-albuterol (DUO-NEB) nebulizer solution 3 mL, 3 mL, Nebulization, 4x Daily - RT, OoGreg MD, 3 mL at 03/18/24 0745    labetalol (NORMODYNE,TRANDATE) injection 10 mg, 10 mg, Intravenous, Q6H PRN, Shira Cadena MD, 10 mg at 03/17/24 1151    morphine injection 4 mg, 4 mg, Intravenous, Q2H PRN, 4 mg at 03/18/24 0823 **AND**  naloxone (NARCAN) injection 0.4 mg, 0.4 mg, Intravenous, Q5 Min PRN, Antoine Ivey MD    OLANZapine (zyPREXA) 5 mg in sterile water (preservative free) 1 mL injection, 5 mg, Intramuscular, Q8H PRN, Greg Nevarez MD, 5 mg at 03/15/24 0153    ondansetron ODT (ZOFRAN-ODT) disintegrating tablet 4 mg, 4 mg, Oral, Q6H PRN **OR** ondansetron (ZOFRAN) injection 4 mg, 4 mg, Intravenous, Q6H PRN, Antoine Ivey MD    pantoprazole (PROTONIX) injection 40 mg, 40 mg, Intravenous, BID Ty LINARES Donna Ann, APRN, 40 mg at 03/18/24 0816    Pharmacy to Dose Zosyn, , Does not apply, Continuous PRN, Antoine Ivey MD    piperacillin-tazobactam (ZOSYN) 4.5 g IVPB in 100 mL NS MBP (CD), 4.5 g, Intravenous, Q8H, Antoine Ivey MD, 4.5 g at 03/18/24 0830    sodium chloride 0.9 % infusion, 100 mL/hr, Intravenous, Continuous, Antoine Ivey MD, Last Rate: 100 mL/hr at 03/18/24 0149, 100 mL/hr at 03/18/24 0149      Objective     Vital Signs  Vitals:    03/18/24 0539 03/18/24 0745 03/18/24 0747 03/18/24 1124   BP: 154/92   (!) 159/103   BP Location: Left arm   Left arm   Patient Position: Lying   Sitting   Pulse: 101 100 86 (!) 129   Resp: 16 16 16 19   Temp: 97.4 °F (36.3 °C)   97.3 °F (36.3 °C)   TempSrc: Oral   Oral   SpO2: 97% 95% 95% 93%   Weight:       Height:           Physical Exam:     General Appearance:    Awake and alert, in no acute distress   Head:    Normocephalic, without obvious abnormality   Eyes:          Conjunctivae normal, anicteric sclera   Throat:   No oral lesions, no thrush, oral mucosa moist   Neck:   No adenopathy, supple, no JVD   Lungs:     respirations regular, even and unlabored   Abdomen:     Soft, generalized tenderness, no rebound or guarding, non-distended, JOSETTE to the right abdomen with small amount of serosanguineous output   Rectal:     Deferred   Extremities:   No edema, no cyanosis   Skin:   No bruising or rash, no jaundice        Results Review:    CBC   "  Results from last 7 days   Lab Units 03/18/24  0717 03/17/24  1003 03/16/24  2309 03/16/24  0105 03/15/24  0234   WBC 10*3/mm3 9.53 11.49* 11.62* 11.27* 18.00*   HEMOGLOBIN g/dL 13.0 13.5 13.0 13.8 14.3   PLATELETS 10*3/mm3 199 190 181 179 243     CMP   Results from last 7 days   Lab Units 03/18/24  0717 03/17/24  1003 03/16/24  2309 03/16/24  0105 03/15/24  0234   SODIUM mmol/L 138 136 136 142 136   POTASSIUM mmol/L 4.5 4.3 4.3 4.9 4.9   CHLORIDE mmol/L 102 100 102 107 101   CO2 mmol/L 22.0 23.0 20.0* 23.0 24.0   BUN mg/dL 9 9 9 11 16   CREATININE mg/dL 0.87 0.83 0.86 1.01 1.04   GLUCOSE mg/dL 125* 126* 158* 153* 142*   ALBUMIN g/dL 3.7  --  3.8 4.0 4.2   BILIRUBIN mg/dL 1.1  --  1.2 1.1 0.8   ALK PHOS U/L 86  --  74 84 94   AST (SGOT) U/L 29  --  30 36 43*   ALT (SGPT) U/L 24  --  31 40 51*   MAGNESIUM mg/dL  --   --  1.7 1.9  --    PHOSPHORUS mg/dL  --   --  3.0 2.3*  --      Cr Clearance Estimated Creatinine Clearance: 150.3 mL/min (by C-G formula based on SCr of 0.87 mg/dL).  Coag     HbA1C No results found for: \"HGBA1C\"      Infection   Results from last 7 days   Lab Units 03/15/24  0234   PROCALCITONIN ng/mL 0.66*     UA      Microbiology Results (last 10 days)       ** No results found for the last 240 hours. **          Imaging Results (Last 72 Hours)       Procedure Component Value Units Date/Time    FL Upper GI Water Soluble [[id number]] Collected: 03/18/24 1107     Updated: 03/18/24 1112    Narrative:      FL UPPER GI WATER SOLUBLE    Date of Exam: 3/18/2024 10:45 AM EDT    Indication: History of duodenal fistula with recent surgical Reilly's patch and cholecystectomy..    Comparison: None available.    Technique:   radiograph of the abdomen was obtained. A single contrast radiographic exam was performed imaging through the upper gastrointestinal tract.    Fluoroscopic Time: 3.8 minutes.    Number of Images: 10 images.    Findings:   radiograph of the abdomen was obtained and demonstrates a " surgical drain in the right upper quadrant. There are skin staples horizontally over the right abdomen. Grossly nonobstructive bowel gas pattern. No free air is identified.    The patient then swallowed water-soluble contrast material, and spot fluoroscopy images were obtained in multiple projections and patient positions to evaluate for leak. The esophagus and stomach have a normal morphology. There is passage of contrast   material into the duodenum to the third portion. No evidence of leak is identified.      Impression:      Impression:  No evidence of gastroduodenal leak.      Electronically Signed: Nando Genao MD    3/18/2024 11:10 AM EDT    Workstation ID: [id number]    XR Abdomen KUB [[id number]] Collected: 03/17/24 0923     Updated: 03/17/24 0926    Narrative:      XR ABDOMEN KUB    Date of Exam: 3/17/2024 9:00 AM EDT    Indication: NG    Comparison: None available.    FINDINGS:  The distal tip of the gastric tube is observed in the stomach in the upper abdomen.      Impression:      The gastric tube is positioned in the stomach.        Electronically Signed: Gonzales Teague MD    3/17/2024 9:23 AM EDT    Workstation ID: [id number]    XR Abdomen KUB [[id number]] Collected: 03/15/24 1250     Updated: 03/15/24 1253    Narrative:      XR ABDOMEN KUB    Date of Exam: 3/15/2024 12:36 PM EDT    Indication: ng tube placement    Comparison: None available.    Findings:  Nasogastric tube tip appears to be within the over the stomach. There are some gas distention of bowel. This might relate to ileus.      Impression:      Impression:  1.Nasogastric tube tip is within the stomach.  2.Nonspecific distention of visualized bowel that might relate to ileus and should be correlated with clinical findings.      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  Currently on Vosevi for recurrence  -Hepatitis B core antibody positive  -COPD  -History of DVT  -Diabetes  -Anxiety  -Hypertension    PLAN:  Patient is a 43-year-old male who came in for an elective cholecystectomy due to gallstones and ended up having an open subtotal cholecystectomy.  Had a duodenal fistula which was repaired with a Reilly patch all on 3/14/2024.  GI was consulted for possible ERCP.     Patient reports that he is doing well today.  Complains of generalized abdominal soreness.  No nausea/vomiting.  Denies bowel movement overnight.  Upper GI today does not show any evidence of gastroduodenal leak.  Therefore, no plans for ERCP.  LFTs normal.  Leukocytosis resolved.  Continue antibiotics.  Plan repeat dose of subcutaneous Relistor.  Continue HCV treatment.   Okay to start clear liquids from GI standpoint if okay with general surgery.  Advance diet as tolerated.  Not much else to add from a GI standpoint as an inpatient at this time.  We will be available as needed.      Electronically signed by ALLY Solo, 03/18/24, 12:47 PM EDT.

## 2024-03-18 NOTE — THERAPY TREATMENT NOTE
Subjective: Pt agreeable to therapeutic plan of care.    Objective:     Bed mobility - N/A or Not attempted.  Transfers - Modified-Independent  Ambulation - 400 feet Modified-Independent RUE support on IV pole    Vitals: WNL    Pain: 5 VAS   Location: abdomen  Intervention for pain: N/A    Education: Provided education on the importance of mobility in the acute care setting and Gait Training    Assessment: Shaun Shanks presents with functional mobility impairments which indicate the need for skilled intervention. Tolerating session today without incident.  Pt with significant improvement in mobility this date ambualting 400 feet without assistance. Pt does not require continued therapy at this time as he is safe to mobilize alone or with nursing staff.     Plan/Recommendations:   If medically appropriate, No ongoing therapy recommended post-acute care. No therapy needs. Pt requires no DME at discharge.     Pt desires Home at discharge. Pt cooperative; agreeable to therapeutic recommendations and plan of care.         Basic Mobility 6-click:  Rollin = Total, A lot = 2, A little = 3; 4 = None  Supine>Sit:   1 = Total, A lot = 2, A little = 3; 4 = None   Sit>Stand with arms:  1 = Total, A lot = 2, A little = 3; 4 = None  Bed>Chair:   1 = Total, A lot = 2, A little = 3; 4 = None  Ambulate in room:  1 = Total, A lot = 2, A little = 3; 4 = None  3-5 Steps with railin = Total, A lot = 2, A little = 3; 4 = None  Score: 23    Modified [place]: N/A = No pre-op stroke/TIA    Post-Tx Position: Up in Chair and Call light and personal items within reach  PPE: gloves

## 2024-03-19 LAB
GLUCOSE BLDC GLUCOMTR-MCNC: 101 MG/DL (ref 70–105)
GLUCOSE BLDC GLUCOMTR-MCNC: 122 MG/DL (ref 70–105)
GLUCOSE BLDC GLUCOMTR-MCNC: 129 MG/DL (ref 70–105)

## 2024-03-19 PROCEDURE — 94799 UNLISTED PULMONARY SVC/PX: CPT

## 2024-03-19 PROCEDURE — 94760 N-INVAS EAR/PLS OXIMETRY 1: CPT

## 2024-03-19 PROCEDURE — 99024 POSTOP FOLLOW-UP VISIT: CPT | Performed by: SURGERY

## 2024-03-19 PROCEDURE — 97116 GAIT TRAINING THERAPY: CPT

## 2024-03-19 PROCEDURE — 97110 THERAPEUTIC EXERCISES: CPT

## 2024-03-19 PROCEDURE — 82948 REAGENT STRIP/BLOOD GLUCOSE: CPT

## 2024-03-19 PROCEDURE — 82948 REAGENT STRIP/BLOOD GLUCOSE: CPT | Performed by: NURSE PRACTITIONER

## 2024-03-19 PROCEDURE — 94664 DEMO&/EVAL PT USE INHALER: CPT

## 2024-03-19 PROCEDURE — 25010000002 PIPERACILLIN SOD-TAZOBACTAM PER 1 G: Performed by: HOSPITALIST

## 2024-03-19 RX ORDER — DULOXETIN HYDROCHLORIDE 30 MG/1
60 CAPSULE, DELAYED RELEASE ORAL 2 TIMES DAILY
Status: DISCONTINUED | OUTPATIENT
Start: 2024-03-19 | End: 2024-03-20 | Stop reason: HOSPADM

## 2024-03-19 RX ORDER — CETIRIZINE HYDROCHLORIDE 10 MG/1
10 TABLET ORAL DAILY
Status: DISCONTINUED | OUTPATIENT
Start: 2024-03-19 | End: 2024-03-20 | Stop reason: HOSPADM

## 2024-03-19 RX ORDER — PREGABALIN 25 MG/1
50 CAPSULE ORAL 2 TIMES DAILY
Status: DISCONTINUED | OUTPATIENT
Start: 2024-03-19 | End: 2024-03-20 | Stop reason: HOSPADM

## 2024-03-19 RX ORDER — HYDROCHLOROTHIAZIDE 25 MG/1
25 TABLET ORAL
Status: DISCONTINUED | OUTPATIENT
Start: 2024-03-19 | End: 2024-03-20 | Stop reason: HOSPADM

## 2024-03-19 RX ORDER — AZELASTINE 1 MG/ML
1 SPRAY, METERED NASAL 2 TIMES DAILY
Status: DISCONTINUED | OUTPATIENT
Start: 2024-03-19 | End: 2024-03-20 | Stop reason: HOSPADM

## 2024-03-19 RX ORDER — GLIPIZIDE 5 MG/1
10 TABLET ORAL
Status: DISCONTINUED | OUTPATIENT
Start: 2024-03-20 | End: 2024-03-20 | Stop reason: HOSPADM

## 2024-03-19 RX ORDER — LOSARTAN POTASSIUM 50 MG/1
100 TABLET ORAL
Status: DISCONTINUED | OUTPATIENT
Start: 2024-03-19 | End: 2024-03-20 | Stop reason: HOSPADM

## 2024-03-19 RX ORDER — METFORMIN HYDROCHLORIDE 500 MG/1
1000 TABLET, EXTENDED RELEASE ORAL
Status: DISCONTINUED | OUTPATIENT
Start: 2024-03-20 | End: 2024-03-20 | Stop reason: HOSPADM

## 2024-03-19 RX ORDER — BUPROPION HYDROCHLORIDE 150 MG/1
150 TABLET ORAL EVERY MORNING
Status: DISCONTINUED | OUTPATIENT
Start: 2024-03-20 | End: 2024-03-20 | Stop reason: HOSPADM

## 2024-03-19 RX ORDER — LEVOTHYROXINE SODIUM 0.05 MG/1
50 TABLET ORAL
Status: DISCONTINUED | OUTPATIENT
Start: 2024-03-20 | End: 2024-03-20 | Stop reason: HOSPADM

## 2024-03-19 RX ADMIN — IPRATROPIUM BROMIDE AND ALBUTEROL SULFATE 3 ML: .5; 3 SOLUTION RESPIRATORY (INHALATION) at 10:41

## 2024-03-19 RX ADMIN — IPRATROPIUM BROMIDE AND ALBUTEROL SULFATE 3 ML: .5; 3 SOLUTION RESPIRATORY (INHALATION) at 07:05

## 2024-03-19 RX ADMIN — DULOXETINE HYDROCHLORIDE 60 MG: 30 CAPSULE, DELAYED RELEASE ORAL at 21:23

## 2024-03-19 RX ADMIN — PANTOPRAZOLE SODIUM 40 MG: 40 INJECTION, POWDER, FOR SOLUTION INTRAVENOUS at 17:39

## 2024-03-19 RX ADMIN — PREGABALIN 50 MG: 25 CAPSULE ORAL at 21:23

## 2024-03-19 RX ADMIN — IPRATROPIUM BROMIDE AND ALBUTEROL SULFATE 3 ML: .5; 3 SOLUTION RESPIRATORY (INHALATION) at 18:20

## 2024-03-19 RX ADMIN — PIPERACILLIN AND TAZOBACTAM 4.5 G: 4; .5 INJECTION, POWDER, FOR SOLUTION INTRAVENOUS at 01:18

## 2024-03-19 RX ADMIN — IPRATROPIUM BROMIDE AND ALBUTEROL SULFATE 3 ML: .5; 3 SOLUTION RESPIRATORY (INHALATION) at 14:30

## 2024-03-19 RX ADMIN — PIPERACILLIN AND TAZOBACTAM 4.5 G: 4; .5 INJECTION, POWDER, FOR SOLUTION INTRAVENOUS at 08:31

## 2024-03-19 RX ADMIN — PIPERACILLIN AND TAZOBACTAM 4.5 G: 4; .5 INJECTION, POWDER, FOR SOLUTION INTRAVENOUS at 17:39

## 2024-03-19 RX ADMIN — HYDROCODONE BITARTRATE AND ACETAMINOPHEN 1 TABLET: 5; 325 TABLET ORAL at 07:22

## 2024-03-19 RX ADMIN — PANTOPRAZOLE SODIUM 40 MG: 40 INJECTION, POWDER, FOR SOLUTION INTRAVENOUS at 07:23

## 2024-03-19 NOTE — NURSING NOTE
"Spoke with Dr Turner and aware of pt behaviors. Pt is a heavy smoker per MD and has hx of bipolar. Will talk with pt about need for IVF/abx after letting pt calm down. Did speak with pt and he stated '' I not taking any more meds\" educated pt on the importance,   "

## 2024-03-19 NOTE — NURSING NOTE
Pt is very irritable and demanding. He is now refusing all care and medications, unhooked infusing IV zosyn. Stating he is ready to go and Dr. Ivey is no longer his doctor and once he sees his doctor in the morning he is going home. Dr. Chavez and Dr. Ivey notified via secure chat. Awaiting return advice.

## 2024-03-19 NOTE — CONSULTS
"Nutrition Services    Patient Name: Shaun Shanks  YOB: 1980  MRN: [id number]  Admission date: 3/14/2024    Comment:  -- Diet advancement per MD      CLINICAL NUTRITION ASSESSMENT      Reason for Assessment 3/19: NPO/liquids x 5 days      H&P      Past Medical History:   Diagnosis Date    Abdominal pain     Anxiety     COPD (chronic obstructive pulmonary disease)     Diabetes mellitus     Disease of thyroid gland     DVT (deep venous thrombosis)     Heart disease     Hepatitis C     Hypertension        Past Surgical History:   Procedure Laterality Date    CHOLECYSTECTOMY N/A 3/14/2024    Procedure: LAPAROSCOPIC cholecystectomy converted to open subtotal cholecystectomy with repair of duodenal fistula with modified gram patch;  Surgeon: Antoine Ivey MD;  Location: [facility] MAIN OR;  Service: General;  Laterality: N/A;    KNEE ARTHROSCOPY Right     MULTIPLE TOOTH EXTRACTIONS      TONSILLECTOMY          Current Problems   Gallstones  - surgery following  - S/P cholecystectomy 3/14  - GI following       Encounter Information        Trending Narrative     3/19: Admitted for scheduled cholecystectomy, S/P 3/14.  Diet advanced to CLD 3/18 and FLD 3/19.  RD visited patient at bedside.  Patient reports he is feeling good and ready for advancement to solids when medically allowed.  NFPE completed and not consistent with nutrition diagnosis of malnutrition at this time using AND/ASPEN criteria.         Anthropometrics        Current Height, Weight Height: 185.4 cm (73\")  Weight: 128 kg (283 lb) (03/14/24 1831)       Usual Body Weight (UBW) Unable to obtain from patient        Trending Weight Hx     This admission: 3/19: 283# (stated) - RD ordered to obtain one             PTA: No recent weights no note     Wt Readings from Last 30 Encounters:   03/14/24 1831 128 kg (283 lb)   03/14/24 0955 128 kg (283 lb)   03/01/24 1621 124 kg (274 lb)   02/21/24 0759 135 kg (297 lb 6.4 oz)   12/12/22 0937 125 kg (275 " "lb)      BMI kg/m2 Body mass index is 37.34 kg/m².       Labs        Pertinent Labs    Results from last 7 days   Lab Units 03/18/24  0717 03/17/24  1003 03/16/24  2309 03/16/24  0105   SODIUM mmol/L 138 136 136 142   POTASSIUM mmol/L 4.5 4.3 4.3 4.9   CHLORIDE mmol/L 102 100 102 107   CO2 mmol/L 22.0 23.0 20.0* 23.0   BUN mg/dL 9 9 9 11   CREATININE mg/dL 0.87 0.83 0.86 1.01   CALCIUM mg/dL 9.0 9.4 8.8 8.8   BILIRUBIN mg/dL 1.1  --  1.2 1.1   ALK PHOS U/L 86  --  74 84   ALT (SGPT) U/L 24  --  31 40   AST (SGOT) U/L 29  --  30 36   GLUCOSE mg/dL 125* 126* 158* 153*     Results from last 7 days   Lab Units 03/18/24  0717 03/17/24  1003 03/16/24  2309 03/16/24  0105   MAGNESIUM mg/dL  --   --  1.7 1.9   PHOSPHORUS mg/dL  --   --  3.0 2.3*   HEMOGLOBIN g/dL 13.0   < > 13.0 13.8   HEMATOCRIT % 39.1   < > 39.8 42.7    < > = values in this interval not displayed.     No results found for: \"HGBA1C\"     Medications    Scheduled Medications insulin lispro, 2-9 Units, Subcutaneous, Q6H  ipratropium-albuterol, 3 mL, Nebulization, 4x Daily - RT  methylnaltrexone, 12 mg, Subcutaneous, Once  pantoprazole, 40 mg, Intravenous, BID AC  piperacillin-tazobactam, 4.5 g, Intravenous, Q8H        Infusions Pharmacy to Dose Zosyn,   sodium chloride, 100 mL/hr, Last Rate: 100 mL/hr (03/18/24 2105)        PRN Medications   albuterol sulfate HFA    dextrose    dextrose    glucagon (human recombinant)    hydrALAZINE    HYDROcodone-acetaminophen **OR** HYDROcodone-acetaminophen    HYDROmorphone **AND** naloxone    labetalol    Morphine **AND** naloxone    OLANZapine (zyPREXA) 5 mg in sterile water (preservative free) 1 mL injection    ondansetron ODT **OR** ondansetron    Pharmacy to Dose Zosyn     Physical Findings        Trending Physical   Appearance, NFPE 3/19: NFPE completed and not consistent with nutrition diagnosis of malnutrition at this time using AND/ASPEN criteria ;l     --  Edema  No edema documented      Bowel Function Last " documented BM 3/19 (today)     Tubes No feeding tube      Chewing/Swallowing Patient denies issues      Skin Incisions x 3        Estimated/Assessed Needs    Estimated 3/19/24   Energy Requirements    EST Needs, Method, Wt used 8928-9353 kcal/day (25-30 kcal/kg of IBW 75 kg)       Protein Requirements    EST Needs, Method, Wt used 60-75 g/day (0.8-1.0 g/kg of IBW 75 kg)       Fluid Requirements     Estimated Needs (mL/day) 1 mL/kcal, will monitor hydration status      Fluid Deficit    Current Na Level (mEq/L)    Desired Na Level (mEq/L)    Estimated Fluid Deficit (L)       Current Nutrition Orders & Evaluation of Intake       Oral Nutrition     Food Allergies NKFA   Current PO Diet Diet: Liquid, Diabetic; Full Liquid; Consistent Carbohydrate; Fluid Consistency: Thin (IDDSI 0)   Supplement None ordered    PO Evaluation     Trending % PO Intake 3/19: Minimal related to liquids      Enteral Nutrition    Enteral Route    Order, Modulars, Flushes    Residual/Tolerance    TF Observation         Parenteral Nutrition     TPN Route    Total # Days on TPN    TPN Order, Lipid Details    MVI & Trace Element Freq    TPN Observation         Nutrition Course Details    PO Diets: NPO 3/14-3/17  CLD 3/18  FLD 3/19     Nutrition Support: RD to monitor for need for nutrition support      Nutritional Risk Screening        NRS-2002 Score          Nutrition Diagnosis         Nutrition Dx Problem 1 Inadequate energy intake related to clinical course as evidenced by NPO/liquids x 5 days since admission.        Nutrition Dx Problem 2        Intervention Goal         Intervention Goal(s) Diet advancement per MD  PO intakes at least 50%     Nutrition Intervention        RD Action Monitor for diet advancement      Nutrition Prescription          Diet Prescription FLD, Consistent CHO   Supplement Prescription None      Enteral Prescription   Initial Goal:  *initial goal conservative d/t risk of RFS     Clinimix 5/20 1L volume + hold lipids       End Goal:    Clinimix 5/20 2000 mL volume + 250 mL 20% lipids 3x/week      Amino Acids   400 kcals (100 g, 125%)    Dextrose  1360 kcals (400 g)    Lipids  215 kcals/day    Total Calories  1975 kcals (in range)    Glucose Infusion Rate 2.17 mg/kg/min    Wt used for  kg          TPN Prescription      Monitor/Evaluation        Monitor Per protocol, I&O, PO intake, Supplement intake, Pertinent labs, Weight, Skin status, GI status, Symptoms, POC/GOC       Electronically signed by:  Genie Gray RD  03/19/24 10:02 EDT

## 2024-03-19 NOTE — PROGRESS NOTES
General Surgery Progress Note    Name: Shaun Shanks ADMIT: 3/14/2024   : 1980  PCP: Diann Wallace MD    MRN: [id number] LOS: 5 days   AGE/SEX: 43 y.o. male  ROOM: [address] [place] [address]    No chief complaint on file.    Subjective     Patient seen and examined.  Vital signs stable, afebrile.  Tolerated clear liquid diet yesterday and full liquid diet today without any issues. Having significant anxiety.  Home medications have not been restarted by hospitalist group.    Objective     Scheduled Medications:   insulin lispro, 2-9 Units, Subcutaneous, Q6H  ipratropium-albuterol, 3 mL, Nebulization, 4x Daily - RT  methylnaltrexone, 12 mg, Subcutaneous, Once  pantoprazole, 40 mg, Intravenous, BID AC  piperacillin-tazobactam, 4.5 g, Intravenous, Q8H        Active Infusions:  Pharmacy to Dose Zosyn,   sodium chloride, 100 mL/hr, Last Rate: 100 mL/hr (24)        As Needed Medications:    albuterol sulfate HFA    dextrose    dextrose    glucagon (human recombinant)    hydrALAZINE    HYDROcodone-acetaminophen **OR** HYDROcodone-acetaminophen    HYDROmorphone **AND** naloxone    labetalol    Morphine **AND** naloxone    OLANZapine (zyPREXA) 5 mg in sterile water (preservative free) 1 mL injection    ondansetron ODT **OR** ondansetron    Pharmacy to Dose Zosyn    Vital Signs  Vital Signs Patient Vitals for the past 24 hrs:   BP Temp Temp src Pulse Resp SpO2   24 1435 -- -- -- 72 14 96 %   24 1430 -- -- -- 72 14 96 %   24 1136 158/91 97.5 °F (36.4 °C) Oral 78 16 98 %   24 1046 -- -- -- 88 14 96 %   24 1041 -- -- -- 88 14 96 %   24 0710 -- -- -- 76 16 100 %   24 0705 -- -- -- 96 16 96 %   24 0524 153/86 97.5 °F (36.4 °C) Oral 92 16 95 %   24 129/86 97.8 °F (36.6 °C) -- 99 17 95 %   24 -- -- -- 90 18 97 %   24 -- -- -- 89 18 94 %   24 1559 -- -- -- 110 18 94 %   24 1550 -- -- -- 110 18 94 %     I/O:  I/O last 3 completed  shifts:  In: 2094.8 [P.O.:1340; I.V.:754.8]  Out: 1380 [Urine:1275; Drains:105]    Physical Exam:  Physical Exam  Constitutional:       General: He is not in acute distress.  Cardiovascular:      Rate and Rhythm: Normal rate.   Pulmonary:      Effort: Pulmonary effort is normal. No respiratory distress.   Abdominal:      Palpations: Abdomen is soft.      Comments: Soft, nondistended, appropriate tenderness.  Incision covered with fatou dressing with old saturation noted.  JOSETTE x 1 present with serosanguineous output.   Neurological:      Mental Status: He is alert and oriented to person, place, and time.   Psychiatric:         Mood and Affect: Mood normal.         Behavior: Behavior normal.         Results Review:     CBC    Results from last 7 days   Lab Units 03/18/24  0717 03/17/24  1003 03/16/24  2309 03/16/24  0105 03/15/24  0234   WBC 10*3/mm3 9.53 11.49* 11.62* 11.27* 18.00*   HEMOGLOBIN g/dL 13.0 13.5 13.0 13.8 14.3   PLATELETS 10*3/mm3 199 190 181 179 243     BMP   Results from last 7 days   Lab Units 03/18/24  0717 03/17/24  1003 03/16/24  2309 03/16/24  0105 03/15/24  0234   SODIUM mmol/L 138 136 136 142 136   POTASSIUM mmol/L 4.5 4.3 4.3 4.9 4.9   CHLORIDE mmol/L 102 100 102 107 101   CO2 mmol/L 22.0 23.0 20.0* 23.0 24.0   BUN mg/dL 9 9 9 11 16   CREATININE mg/dL 0.87 0.83 0.86 1.01 1.04   GLUCOSE mg/dL 125* 126* 158* 153* 142*   MAGNESIUM mg/dL  --   --  1.7 1.9  --    PHOSPHORUS mg/dL  --   --  3.0 2.3*  --      Radiology(recent) FL Upper GI Water Soluble    Result Date: 3/18/2024  Impression: No evidence of gastroduodenal leak. Electronically Signed: Nando Genao MD  3/18/2024 11:10 AM EDT  Workstation ID: [id number]     I reviewed the patient's new clinical results.    Assessment & Plan       Gallstones    Duodenal fistula    S/P cholecystectomy      43 y.o. male POD 5 status post laparoscopic cholecystectomy converted to open subtotal cholecystectomy with repair of duodenal fistula with modified Reilly  patch     Full liquid diets today, will change to soft diet tomorrow.  Antiemetics and pain medication as needed  Continue to ambulate, out of bed to chair  Continue working with PT/OT  Use incentive spirometer bedside  Continue JOSETTE drain, strip and empty every shift and as needed  Monitor quality of JOSETTE drainage for signs of bile leak  Continue IV antibiotics  Recommend restarting patient's home medications as he is now able to take food by mouth.  The patient does have a significant psych history and is complaining of severe anxiety.  Probable discharge home tomorrow    This note was created using Dragon Voice Recognition software.    Antoine Ivey MD  03/19/24  15:34 EDT

## 2024-03-19 NOTE — PROGRESS NOTES
LOS: 5 days   Patient Care Team:  Diann Wallace MD as PCP - General (Family Medicine)  Antoine Ivey MD as Surgeon (General Surgery)        Subjective  - sitting on chair, tolerating liquid well, hab 2 BMs    Interval History:     Patient Complaints: nausea    Review of Systems   Constitutional:  Positive for appetite change and fatigue.   Eyes: Negative.    Respiratory: Negative.     Cardiovascular: Negative.    Gastrointestinal:  Positive for nausea.   Endocrine: Negative.    Genitourinary: Negative.    Musculoskeletal: Negative.    Skin: Negative.    Neurological: Negative.    Psychiatric/Behavioral:  Positive for behavioral problems and sleep disturbance. The patient is nervous/anxious.         Objective     Vital Signs  Temp:  [97.3 °F (36.3 °C)-97.8 °F (36.6 °C)] 97.5 °F (36.4 °C)  Heart Rate:  [] 76  Resp:  [16-19] 16  BP: (129-159)/() 153/86    Physical Exam:     General Appearance:    Alert, cooperative, in no acute distress   Ears:    Ears appear intact with no abnormalities noted   Throat:   No oral lesions, no thrush, oral mucosa moist   Neck:   No adenopathy, supple, trachea midline, no thyromegaly, no   carotid bruit, no JVD   Lungs:     Clear to auscultation, respirations regular, even and              Unlabored     Heart:    Regular rhythm and normal rate, normal S1 and S2, no          murmur, no gallop, no rub, no click   Abdomen:     Normal bowel sounds, no masses, no organomegaly, soft      nontender, nondistended, no guarding, no rebound                tenderness   Extremities:   Moves all extremities well, no edema, no cyanosis, no           redness   Skin:   No bleeding, bruising or rash   Neurologic:   Cranial nerves 2 - 12 grossly intact, sensation intact, DTR       present and equal bilaterally        Results Review:    Lab Results (last 24 hours)       Procedure Component Value Units Date/Time    POC Glucose 4x Daily Before Meals & at Bedtime [[id number]]   (Abnormal) Collected: 03/19/24 0722    Specimen: Blood Updated: 03/19/24 0723     Glucose 129 mg/dL      Comment: Serial Number: [id number]:  [id number]       POC Glucose Once [[id number]]  (Abnormal) Collected: 03/18/24 2044    Specimen: Blood Updated: 03/18/24 2045     Glucose 112 mg/dL      Comment: Serial Number: [id number]:  [id number]       POC Glucose Once [[id number]]  (Abnormal) Collected: 03/18/24 1625    Specimen: Blood Updated: 03/18/24 1626     Glucose 118 mg/dL      Comment: Serial Number: [id number]:  [id number]       POC Glucose 4x Daily Before Meals & at Bedtime [[id number]]  (Abnormal) Collected: 03/18/24 1123    Specimen: Blood Updated: 03/18/24 1124     Glucose 120 mg/dL      Comment: Serial Number: [id number]:  [id number]              Imaging Results (Last 24 Hours)       Procedure Component Value Units Date/Time    FL Upper GI Water Soluble [[id number]] Collected: 03/18/24 1107     Updated: 03/18/24 1112    Narrative:      FL UPPER GI WATER SOLUBLE    Date of Exam: 3/18/2024 10:45 AM EDT    Indication: History of duodenal fistula with recent surgical Reilly's patch and cholecystectomy..    Comparison: None available.    Technique:   radiograph of the abdomen was obtained. A single contrast radiographic exam was performed imaging through the upper gastrointestinal tract.    Fluoroscopic Time: 3.8 minutes.    Number of Images: 10 images.    Findings:   radiograph of the abdomen was obtained and demonstrates a surgical drain in the right upper quadrant. There are skin staples horizontally over the right abdomen. Grossly nonobstructive bowel gas pattern. No free air is identified.    The patient then swallowed water-soluble contrast material, and spot fluoroscopy images were obtained in multiple projections and patient positions to evaluate for leak. The esophagus and stomach have a normal morphology. There is passage of contrast   material into the duodenum to the  third portion. No evidence of leak is identified.      Impression:      Impression:  No evidence of gastroduodenal leak.      Electronically Signed: Nando Genao MD    3/18/2024 11:10 AM EDT    Workstation ID: [id number]             I reviewed the patient's other test results and agree with the interpretation    Medication Review:     Current Facility-Administered Medications:     albuterol sulfate HFA (PROVENTIL HFA;VENTOLIN HFA;PROAIR HFA) inhaler 2 puff, 2 puff, Inhalation, Q4H PRN, Antoine Ivey MD    dextrose (D50W) (25 g/50 mL) IV injection 25 g, 25 g, Intravenous, Q15 Min PRN, Juliet Huizar APRN    dextrose (GLUTOSE) oral gel 15 g, 15 g, Oral, Q15 Min PRN, Juliet Huizar APRSEGUN    glucagon (GLUCAGEN) injection 1 mg, 1 mg, Intramuscular, Q15 Min PRN, Juliet Huizar APRN    hydrALAZINE (APRESOLINE) injection 10 mg, 10 mg, Intravenous, Q6H PRN, Shira Cadena MD, 10 mg at 03/16/24 1733    HYDROcodone-acetaminophen (NORCO) 5-325 MG per tablet 1 tablet, 1 tablet, Oral, Q4H PRN, 1 tablet at 03/19/24 0722 **OR** HYDROcodone-acetaminophen (NORCO) 5-325 MG per tablet 2 tablet, 2 tablet, Oral, Q4H PRN, Antoine Ivey MD, 2 tablet at 03/18/24 2102    HYDROmorphone (DILAUDID) injection 0.5 mg, 0.5 mg, Intravenous, Q2H PRN, 0.5 mg at 03/17/24 2050 **AND** naloxone (NARCAN) injection 0.1 mg, 0.1 mg, Intravenous, Q5 Min PRN, Antoine Ivey MD    insulin lispro (HUMALOG/ADMELOG) injection 2-9 Units, 2-9 Units, Subcutaneous, Q6H, Sanjeev Huizara, APRN, 2 Units at 03/15/24 1216    ipratropium-albuterol (DUO-NEB) nebulizer solution 3 mL, 3 mL, Nebulization, 4x Daily - RT, Greg Nevarez MD, 3 mL at 03/19/24 0705    labetalol (NORMODYNE,TRANDATE) injection 10 mg, 10 mg, Intravenous, Q6H PRN, Shira Cadena MD, 10 mg at 03/17/24 1151    methylnaltrexone (RELISTOR) injection 12 mg, 12 mg, Subcutaneous, Once, Claire Lund, ALLY    morphine injection 4 mg, 4 mg, Intravenous, Q2H PRN, 4 mg at  03/18/24 0823 **AND** naloxone (NARCAN) injection 0.4 mg, 0.4 mg, Intravenous, Q5 Min PRN, Antoine Ivey MD    OLANZapine (zyPREXA) 5 mg in sterile water (preservative free) 1 mL injection, 5 mg, Intramuscular, Q8H PRN, Greg Nevarez MD, 5 mg at 03/15/24 0153    ondansetron ODT (ZOFRAN-ODT) disintegrating tablet 4 mg, 4 mg, Oral, Q6H PRN **OR** ondansetron (ZOFRAN) injection 4 mg, 4 mg, Intravenous, Q6H PRN, Antoine Ivey MD    pantoprazole (PROTONIX) injection 40 mg, 40 mg, Intravenous, BID AC, Trixie Crawford, APRN, 40 mg at 03/19/24 0723    Pharmacy to Dose Zosyn, , Does not apply, Continuous PRN, Antoine Ivey MD    piperacillin-tazobactam (ZOSYN) 4.5 g IVPB in 100 mL NS MBP (CD), 4.5 g, Intravenous, Q8H, Shira Cadena MD, 4.5 g at 03/19/24 0831    sodium chloride 0.9 % infusion, 100 mL/hr, Intravenous, Continuous, Antoine Ivey MD, Last Rate: 100 mL/hr at 03/18/24 2105, 100 mL/hr at 03/18/24 2105    I have reviewed medication list.    Assessment & Plan     Principal Problem:    Gallstones  -  S/P  open cholecystectomy , doing well,   Active Problems:    Duodenal fistula  - s/p Repair , doing well, tolerating clear liquid, had 2 Bms, JOSETTE drain in position     DM - accu check and SSI    COPD - Stable    HTN - Stable     Bipolar with depression - continue home medicine     Tobacco abuse - counseled to quit smoking    Stress ulcer/DVT prophylaxis               Plan for disposition: Home at discharge    Anmol Chavez MD  03/19/24  08:34 EDT

## 2024-03-19 NOTE — PLAN OF CARE
"Goal Outcome Evaluation:  Plan of Care Reviewed With: patient        Progress: improving  Outcome Evaluation: Pt up ad tonio, in chair for meals, declined to walk in hallways but did work with PT today. Pt became irritable this afternoon stating he was \"just ready to go\" Dr. Ivey was at bedside. Pt is tolerating full liquid diet and will most likely advance diet again tomorrow per Dr. Ivey. Pt continues JOSETTE drain and OLEKSANDR. VSS, pt blood pressure slightly elevated but refusing home medications at this time.                               "

## 2024-03-19 NOTE — PLAN OF CARE
Goal Outcome Evaluation:Dr Chavez called and is taking over pt care. Pt remains on IV abx, OLEKSANDR dressing. JOSETTE drain  in place. Pt tolerating Clear liquids without nausea. Pain managed per oral pain med. Plan of care ongoing

## 2024-03-19 NOTE — THERAPY TREATMENT NOTE
"Subjective: Pt agreeable to therapeutic plan of care.    Objective:     Bed mobility - Independent  Transfers - Independent 2x STS  Ambulation - 400 feet Modified-Independent held onto IV pole during    Therapeutic Exercise - 10 Reps B LE AROM unsupported sitting / EOB LAQ    Vitals: WNL    Education: Provided education on the importance of mobility in the acute care setting, Verbal/Tactile Cues, ADL training, and Gait Training  Patient educated over \"eccentric control\" w/ bending the knee during LAQ    Assessment: Shaun Shanks presents with functional mobility impairments which indicate the need for skilled intervention. He presented slightly agitated and expressed he \"did not want any assistance.\" He demonstrated bed mobility independently and performed STS transfer independently. He ambulated 400' mod-independent, utilizing the IV pole during. He had no expressions of fatigue/SOA. BLE LAQ were performed for 10 reps seated on EOB, where he additionally expressed no desire for assistance/education while completing this activity. Patient is functioning at his baseline. Tolerating session today without incident. PT recommends home upon appropriate DC from [facility]. Will continue to follow and progress as tolerated.     Plan/Recommendations:   If medically appropriate, No ongoing therapy recommended post-acute care. No therapy needs. Pt requires no DME at discharge.     Pt desires Home at discharge. Pt cooperative; agreeable to therapeutic recommendations and plan of care.         Basic Mobility 6-click:  Rollin = Total, A lot = 2, A little = 3; 4 = None  Supine>Sit:   1 = Total, A lot = 2, A little = 3; 4 = None   Sit>Stand with arms:  1 = Total, A lot = 2, A little = 3; 4 = None  Bed>Chair:   1 = Total, A lot = 2, A little = 3; 4 = None  Ambulate in room:  1 = Total, A lot = 2, A little = 3; 4 = None  3-5 Steps with railin = Total, A lot = 2, A little = 3; 4 = None  Score: 21    Modified [place]: N/A = No " pre-op stroke/TIA    Post-Tx Position: Supine with HOB Elevated, Alarms activated, and Call light and personal items within reach  PPE: gloves

## 2024-03-19 NOTE — CASE MANAGEMENT/SOCIAL WORK
Continued Stay Note  BIBIANA Mac     Patient Name: Shaun Shanks  MRN: [id number]  Today's Date: 3/19/2024    Admit Date: 3/14/2024    Plan: Return home with Mom, Mom can transport at dc.   Discharge Plan       Row Name 03/19/24 1237       Plan    Plan Return home with Mom, Mom can transport at dc.    Plan Comments DC barriers: JOSETTE drain, IV antibiotics, clear liquids diet,  pending advancement of diet.                    Expected Discharge Date and Time       Expected Discharge Date Expected Discharge Time    Mar 20, 2024             Eliane Underwood RN

## 2024-03-19 NOTE — PLAN OF CARE
"Assessment: Shaun Shanks presents with functional mobility impairments which indicate the need for skilled intervention. He presented slightly agitated and expressed he \"did not want any assistance.\" He demonstrated bed mobility independently and performed STS transfer independently. He ambulated 400' mod-independent, utilizing the IV pole during. He had no expressions of fatigue/SOA. BLE LAQ were performed for 10 reps seated on EOB, where he additionally expressed no desire for assistance/education while completing this activity. Patient is functioning at his baseline. Tolerating session today without incident. PT recommends home upon appropriate DC from [facility]. Will continue to follow and progress as tolerated.   "

## 2024-03-20 VITALS
SYSTOLIC BLOOD PRESSURE: 111 MMHG | OXYGEN SATURATION: 95 % | WEIGHT: 282.98 LBS | RESPIRATION RATE: 17 BRPM | HEIGHT: 73 IN | BODY MASS INDEX: 37.5 KG/M2 | HEART RATE: 104 BPM | TEMPERATURE: 97 F | DIASTOLIC BLOOD PRESSURE: 73 MMHG

## 2024-03-20 LAB
GLUCOSE BLDC GLUCOMTR-MCNC: 118 MG/DL (ref 70–105)
GLUCOSE BLDC GLUCOMTR-MCNC: 93 MG/DL (ref 70–105)

## 2024-03-20 PROCEDURE — 99024 POSTOP FOLLOW-UP VISIT: CPT | Performed by: SURGERY

## 2024-03-20 PROCEDURE — 25010000002 HYDRALAZINE PER 20 MG: Performed by: HOSPITALIST

## 2024-03-20 PROCEDURE — 25010000002 PIPERACILLIN SOD-TAZOBACTAM PER 1 G: Performed by: HOSPITALIST

## 2024-03-20 PROCEDURE — 94799 UNLISTED PULMONARY SVC/PX: CPT

## 2024-03-20 PROCEDURE — 82948 REAGENT STRIP/BLOOD GLUCOSE: CPT

## 2024-03-20 PROCEDURE — 25810000003 SODIUM CHLORIDE 0.9 % SOLUTION: Performed by: SURGERY

## 2024-03-20 PROCEDURE — 94761 N-INVAS EAR/PLS OXIMETRY MLT: CPT

## 2024-03-20 PROCEDURE — 94664 DEMO&/EVAL PT USE INHALER: CPT

## 2024-03-20 RX ORDER — HYDROCODONE BITARTRATE AND ACETAMINOPHEN 5; 325 MG/1; MG/1
1 TABLET ORAL EVERY 4 HOURS PRN
Qty: 30 TABLET | Refills: 0 | Status: SHIPPED | OUTPATIENT
Start: 2024-03-20 | End: 2024-03-27

## 2024-03-20 RX ADMIN — HYDROCODONE BITARTRATE AND ACETAMINOPHEN 2 TABLET: 5; 325 TABLET ORAL at 00:08

## 2024-03-20 RX ADMIN — CETIRIZINE HYDROCHLORIDE 10 MG: 10 TABLET, FILM COATED ORAL at 08:42

## 2024-03-20 RX ADMIN — LINAGLIPTIN 5 MG: 5 TABLET, FILM COATED ORAL at 08:42

## 2024-03-20 RX ADMIN — BUPROPION HYDROCHLORIDE 150 MG: 150 TABLET, EXTENDED RELEASE ORAL at 07:35

## 2024-03-20 RX ADMIN — PANTOPRAZOLE SODIUM 40 MG: 40 INJECTION, POWDER, FOR SOLUTION INTRAVENOUS at 07:36

## 2024-03-20 RX ADMIN — HYDROCODONE BITARTRATE AND ACETAMINOPHEN 2 TABLET: 5; 325 TABLET ORAL at 14:02

## 2024-03-20 RX ADMIN — HYDRALAZINE HYDROCHLORIDE 10 MG: 20 INJECTION INTRAMUSCULAR; INTRAVENOUS at 04:18

## 2024-03-20 RX ADMIN — PIPERACILLIN AND TAZOBACTAM 4.5 G: 4; .5 INJECTION, POWDER, FOR SOLUTION INTRAVENOUS at 08:42

## 2024-03-20 RX ADMIN — IPRATROPIUM BROMIDE AND ALBUTEROL SULFATE 3 ML: .5; 3 SOLUTION RESPIRATORY (INHALATION) at 11:22

## 2024-03-20 RX ADMIN — METFORMIN HYDROCHLORIDE 1000 MG: 500 TABLET, EXTENDED RELEASE ORAL at 07:35

## 2024-03-20 RX ADMIN — EMPAGLIFLOZIN 25 MG: 25 TABLET, FILM COATED ORAL at 08:42

## 2024-03-20 RX ADMIN — DULOXETINE HYDROCHLORIDE 60 MG: 30 CAPSULE, DELAYED RELEASE ORAL at 08:42

## 2024-03-20 RX ADMIN — IPRATROPIUM BROMIDE AND ALBUTEROL SULFATE 3 ML: .5; 3 SOLUTION RESPIRATORY (INHALATION) at 08:10

## 2024-03-20 RX ADMIN — GLIPIZIDE 10 MG: 5 TABLET ORAL at 07:35

## 2024-03-20 RX ADMIN — SODIUM CHLORIDE 100 ML/HR: 9 INJECTION, SOLUTION INTRAVENOUS at 04:12

## 2024-03-20 RX ADMIN — PREGABALIN 50 MG: 25 CAPSULE ORAL at 08:42

## 2024-03-20 RX ADMIN — PIPERACILLIN AND TAZOBACTAM 4.5 G: 4; .5 INJECTION, POWDER, FOR SOLUTION INTRAVENOUS at 01:32

## 2024-03-20 RX ADMIN — LEVOTHYROXINE SODIUM 50 MCG: 0.05 TABLET ORAL at 06:16

## 2024-03-20 RX ADMIN — PREGABALIN 50 MG: 25 CAPSULE ORAL at 00:06

## 2024-03-20 RX ADMIN — LOSARTAN POTASSIUM 100 MG: 50 TABLET, FILM COATED ORAL at 08:42

## 2024-03-20 RX ADMIN — HYDROCHLOROTHIAZIDE 25 MG: 25 TABLET ORAL at 08:42

## 2024-03-20 RX ADMIN — HYDROCODONE BITARTRATE AND ACETAMINOPHEN 2 TABLET: 5; 325 TABLET ORAL at 07:40

## 2024-03-20 NOTE — DISCHARGE INSTRUCTIONS
Ok for discharge  Follow-up with me (Dr. Ivey) in 2 weeks  Regular diet as tolerated  Ok to shower starting tomorrow. No tub baths, pools, lakes, or streams for 1 week.  Ok to apply ice to incisions  No heavy lifting (greater than 20 lbs) for 6 weeks after surgery  Call my office or present to the ED with: fevers greater than 101.5, redness around the incisions, drainage from the incisions, intractable nausea/vomiting, or pain that is getting worse instead of better

## 2024-03-20 NOTE — PLAN OF CARE
"Goal Outcome Evaluation: Spoke with MD and pt mom tonight r/t pt refusing meds/procedures. Pt was checked on several times but left to calm down.  Spoke with pt around 2330 and stated \"I am just sick of being here\" Pt wants to go home. Agreeable to take meds and IV abx. Up dated pt mom. Plan of care ongoing                                              "

## 2024-03-20 NOTE — DISCHARGE SUMMARY
GENERAL SURGERY DISCHARGE SUMMARY    Date of Discharge:  3/20/2024    Discharge Diagnosis: duodenal fistula    Presenting Problem/History of Present Illness  Active Hospital Problems    Diagnosis  POA    **Gallstones [K80.20]  Yes    Duodenal fistula [K31.6]  Yes    S/P cholecystectomy [Z90.49]  Not Applicable      Resolved Hospital Problems   No resolved problems to display.      Hospital Course  Patient is a 43 y.o. male presented with the chief complaint of gallstones for an elective laparoscopic cholecystectomy.  Intraoperatively however, the patient was found to have a duodenal fistula to his gallbladder which required conversion to open with repair of his duodenal fistula with a modified Reilly patch and subsequent subtotal cholecystectomy.  A surgical drain was placed in the area around the repair, the patient was admitted to the floor where he was observed for a number of days.  On postoperative day 3, the patient underwent an upper GI which demonstrated no evidence of leak.  He was then started on a clear liquid diet and slowly advance to a soft diet which did not result in any breakdown of his duodenal repair, nor any bilious output from his drain.  On the day of discharge, the patient was afebrile, his pain was well-controlled, he was ambulatory, and the decision was made to discharge him with his drain in place with plans to follow-up with me in 1 week for removal in the office..      Procedures Performed    Procedure(s):  LAPAROSCOPIC cholecystectomy converted to open subtotal cholecystectomy with repair of duodenal fistula with modified gram patch  -------------------       Consults:   Consults       Date and Time Order Name Status Description    3/14/2024  6:37 PM Inpatient Gastroenterology Consult      3/14/2024  6:37 PM Inpatient Hospitalist Consult              Pertinent Test Results:  None    Condition on Discharge: Improved    Vital Signs  Temp:  [97 °F (36.1 °C)-97.7 °F (36.5 °C)] 97 °F (36.1  °C)  Heart Rate:  [] 104  Resp:  [16-18] 17  BP: (111-182)/(73-95) 111/73    Physical Exam:  Physical Exam  Vitals reviewed.   Abdominal:      General: There is no distension.      Palpations: Abdomen is soft.      Tenderness: There is no abdominal tenderness.      Comments: Fang wound VAC in place in the right upper quadrant.  Minimal strikethrough.  JOSETTE drain is serosanguineous.  Umbilical incision is well-approximated with skin staples and no surrounding erythema, ration, or drainage to suggest infection.         Discharge Disposition  Home or Self Care    Discharge Medications     Discharge Medications        New Medications        Instructions Start Date   HYDROcodone-acetaminophen 5-325 MG per tablet  Commonly known as: NORCO   1 tablet, Oral, Every 4 Hours PRN             Continue These Medications        Instructions Start Date   albuterol sulfate  (90 Base) MCG/ACT inhaler  Commonly known as: PROVENTIL HFA;VENTOLIN HFA;PROAIR HFA   Inhale 2 puffs Every 4 (Four) Hours As Needed for Wheezing or Shortness of Air.      azelastine 0.1 % nasal spray  Commonly known as: ASTELIN   1 spray, Nasal, 2 Times Daily, Use in each nostril as directed      Breztri Aerosphere 160-9-4.8 MCG/ACT aerosol inhaler  Generic drug: Budeson-Glycopyrrol-Formoterol   2 puffs, Inhalation, 2 Times Daily      buPROPion  MG 24 hr tablet  Commonly known as: WELLBUTRIN XL   150 mg, Oral, Every Morning      DULoxetine 60 MG capsule  Commonly known as: CYMBALTA   Take 1 capsule by mouth 2 (Two) Times a Day.      fexofenadine 180 MG tablet  Commonly known as: ALLEGRA   180 mg, Oral, Daily      glimepiride 4 MG tablet  Commonly known as: AMARYL   4 mg, Oral, 2 Times Daily With Meals      Kerendia 10 MG tablet  Generic drug: Finerenone   1 tablet, Oral, Daily      levothyroxine 50 MCG tablet  Commonly known as: SYNTHROID, LEVOTHROID   50 mcg, Oral, Daily      losartan-hydrochlorothiazide 100-25 MG per tablet  Commonly known as:  HYZAAR   1 tablet, Oral, Daily      metFORMIN  MG 24 hr tablet  Commonly known as: GLUCOPHAGE-XR   Take 2 tablets by mouth Daily With Breakfast.      pregabalin 50 MG capsule  Commonly known as: LYRICA   50 mg, Oral, 2 Times Daily      Qtern 10-5 MG tablet  Generic drug: Dapagliflozin-sAXagliptin   1 tablet, Oral, Daily      Vosevi 400-100-100 MG tablet  Generic drug: Ocvokvno-Dswlkavuu-Btlznwgkiq   1 tablet, Oral, Daily               Discharge Diet:   Diet Instructions       Diet: Diabetic Diets; Consistent Carbohydrate; Soft to Chew (NDD 3); Ground Meat; Thin (IDDSI 0)      Discharge Diet: Diabetic Diets    Diabetic Diet: Consistent Carbohydrate    Texture: Soft to Chew (NDD 3)    Soft to Chew: Ground Meat    Fluid Consistency: Thin (IDDSI 0)            Activity at Discharge:   Activity Instructions       Discharge Activity      1) No driving while taking narcotics.   2) Return to school / work after drain removal.  3) May shower today.  4) Do not lift / push / pull more then 20 lbs.            Follow-up Appointments  No future appointments.  Additional Instructions for the Follow-ups that You Need to Schedule       Discharge Follow-up with Specified Provider: [facility]; 1 Week   As directed      To: [facility]   Follow Up: 1 Week   Follow Up Details: for drain removal        Notify Physician or Go To The ED For the Following Conditions   As directed      For redness around the incisions, drainage from the incisions, or fevers greater than 101.5    Order Comments: For redness around the incisions, drainage from the incisions, or fevers greater than 101.5                 Test Results Pending at Discharge       Antoine Ivey MD  03/20/24  15:20 EDT

## 2024-03-20 NOTE — CASE MANAGEMENT/SOCIAL WORK
Case Management Discharge Note      Final Note: HOME    Provided Post Acute Provider List?: N/A  N/A Provider List Comment: denies dc needs  Provided Post Acute Provider Quality & Resource List?: N/A    Selected Continued Care - Discharged on 3/20/2024 Admission date: 3/14/2024 - Discharge disposition: Home or Self Care            Transportation Services  Private: Car    Final Discharge Disposition Code: 01 - home or self-care

## 2024-03-20 NOTE — CASE MANAGEMENT/SOCIAL WORK
Continued Stay Note  BIBIANA Mac     Patient Name: Shaun Shanks  MRN: [id number]  Today's Date: 3/20/2024    Admit Date: 3/14/2024    Plan: Return home with Mom, Mom can transport at dc.   Discharge Plan       Row Name 03/20/24 1334       Plan    Plan Return home with Mom, Mom can transport at dc.    Plan Comments DC barriers: JOSETTE drain, IV antibiotics, toleration of new diet               Expected Discharge Date and Time       Expected Discharge Date Expected Discharge Time    Mar 21, 2024           Eliane Underwood RN

## 2024-03-20 NOTE — PLAN OF CARE
Goal Outcome Evaluation:  Plan of Care Reviewed With: patient              Patient up in shower today, ambulating in hallway and in room, pain controlled, pleasant and calm    Problem: Adult Inpatient Plan of Care  Goal: Absence of Hospital-Acquired Illness or Injury  Intervention: Identify and Manage Fall Risk  Recent Flowsheet Documentation  Taken 3/20/2024 1200 by Jenny Daniel RN  Safety Promotion/Fall Prevention: safety round/check completed  Taken 3/20/2024 1000 by Jenny Daniel RN  Safety Promotion/Fall Prevention: safety round/check completed  Taken 3/20/2024 0800 by Jenny Daniel RN  Safety Promotion/Fall Prevention:   safety round/check completed   fall prevention program maintained     Problem: Adult Inpatient Plan of Care  Goal: Absence of Hospital-Acquired Illness or Injury  Intervention: Prevent and Manage VTE (Venous Thromboembolism) Risk  Recent Flowsheet Documentation  Taken 3/20/2024 0800 by Jenny Daniel RN  Activity Management:   ambulated in room   ambulated to bathroom  VTE Prevention/Management: patient refused intervention  Range of Motion: active ROM (range of motion) encouraged

## 2024-03-20 NOTE — PROGRESS NOTES
LOS: 6 days   Patient Care Team:  Diann Wallace MD as PCP - General (Family Medicine)  Antoine Ivey MD as Surgeon (General Surgery)        Subjective  - sitting on chair, tolerating po well    Interval History:     Patient Complaints: wants to go home    Review of Systems   Constitutional:  Positive for appetite change and fatigue.   Eyes: Negative.    Respiratory: Negative.     Cardiovascular: Negative.    Gastrointestinal:  Negative for nausea.   Endocrine: Negative.    Genitourinary: Negative.    Musculoskeletal: Negative.    Skin: Negative.    Neurological: Negative.    Psychiatric/Behavioral:  Positive for behavioral problems and sleep disturbance. The patient is nervous/anxious.         Objective     Vital Signs  Temp:  [97.5 °F (36.4 °C)-97.7 °F (36.5 °C)] 97.7 °F (36.5 °C)  Heart Rate:  [] 81  Resp:  [14-18] 16  BP: (158-182)/(82-95) 159/82    Physical Exam:     General Appearance:    Alert, cooperative, in no acute distress   Ears:    Ears appear intact with no abnormalities noted   Throat:   No oral lesions, no thrush, oral mucosa moist   Neck:   No adenopathy, supple, trachea midline, no thyromegaly, no   carotid bruit, no JVD   Lungs:     Clear to auscultation, respirations regular, even and              Unlabored     Heart:    Regular rhythm and normal rate, normal S1 and S2, no          murmur, no gallop, no rub, no click   Abdomen:     Normal bowel sounds, no masses, no organomegaly, soft      nontender, nondistended, no guarding, no rebound                tenderness   Extremities:   Moves all extremities well, no edema, no cyanosis, no           redness   Skin:   No bleeding, bruising or rash   Neurologic:   Cranial nerves 2 - 12 grossly intact, sensation intact, DTR       present and equal bilaterally        Results Review:    Lab Results (last 24 hours)       Procedure Component Value Units Date/Time    POC Glucose Once [[id number]]  (Abnormal) Collected: 03/20/24 9805     Specimen: Blood Updated: 03/20/24 0734     Glucose 118 mg/dL      Comment: Serial Number: [id number]:  [id number]       POC Glucose Once [[id number]]  (Normal) Collected: 03/19/24 1653    Specimen: Blood Updated: 03/19/24 1655     Glucose 101 mg/dL      Comment: Serial Number: [id number]:  [id number]       POC Glucose 4x Daily Before Meals & at Bedtime [[id number]]  (Abnormal) Collected: 03/19/24 1134    Specimen: Blood Updated: 03/19/24 1135     Glucose 122 mg/dL      Comment: Serial Number: [id number]:  [id number]              Imaging Results (Last 24 Hours)       ** No results found for the last 24 hours. **             I reviewed the patient's other test results and agree with the interpretation    Medication Review:     Current Facility-Administered Medications:     albuterol sulfate HFA (PROVENTIL HFA;VENTOLIN HFA;PROAIR HFA) inhaler 2 puff, 2 puff, Inhalation, Q4H PRN, Antoine Ivey MD    azelastine (ASTELIN) nasal spray 1 spray, 1 spray, Each Nare, BID, Anmol Chavez MD    buPROPion XL (WELLBUTRIN XL) 24 hr tablet 150 mg, 150 mg, Oral, QAM, Anmol Chavez MD, 150 mg at 03/20/24 0735    cetirizine (zyrTEC) tablet 10 mg, 10 mg, Oral, Daily, Anmol Chavez MD    dextrose (D50W) (25 g/50 mL) IV injection 25 g, 25 g, Intravenous, Q15 Min PRN, Juliet Huizar APRN    dextrose (GLUTOSE) oral gel 15 g, 15 g, Oral, Q15 Min PRN, Juliet Huizar APRN    DULoxetine (CYMBALTA) DR capsule 60 mg, 60 mg, Oral, BID, Anmol Chavez MD, 60 mg at 03/19/24 2123    empagliflozin (JARDIANCE) tablet 25 mg, 25 mg, Oral, Daily **AND** linagliptin (TRADJENTA) tablet 5 mg, 5 mg, Oral, Daily, Anmol Chavez MD    glipizide (GLUCOTROL) tablet 10 mg, 10 mg, Oral, QAM AC, Anmol Chavez MD, 10 mg at 03/20/24 0735    glucagon (GLUCAGEN) injection 1 mg, 1 mg, Intramuscular, Q15 Min PRN, Juliet Huizar APRN    hydrALAZINE (APRESOLINE) injection 10 mg, 10  mg, Intravenous, Q6H PRN, Shira Cadena MD, 10 mg at 03/20/24 0418    losartan (COZAAR) tablet 100 mg, 100 mg, Oral, Q24H **AND** hydroCHLOROthiazide tablet 25 mg, 25 mg, Oral, Q24H, Anmol Chavez MD    HYDROcodone-acetaminophen (NORCO) 5-325 MG per tablet 1 tablet, 1 tablet, Oral, Q4H PRN, 1 tablet at 03/19/24 0722 **OR** HYDROcodone-acetaminophen (NORCO) 5-325 MG per tablet 2 tablet, 2 tablet, Oral, Q4H PRN, Antoine Ivey MD, 2 tablet at 03/20/24 0740    HYDROmorphone (DILAUDID) injection 0.5 mg, 0.5 mg, Intravenous, Q2H PRN, 0.5 mg at 03/17/24 2050 **AND** naloxone (NARCAN) injection 0.1 mg, 0.1 mg, Intravenous, Q5 Min PRN, Antoine Ivey MD    insulin lispro (HUMALOG/ADMELOG) injection 2-9 Units, 2-9 Units, Subcutaneous, Q6H, Juliet Huizar, APRN, 2 Units at 03/15/24 1216    ipratropium-albuterol (DUO-NEB) nebulizer solution 3 mL, 3 mL, Nebulization, 4x Daily - RT, Greg Nevarez MD, 3 mL at 03/20/24 0810    labetalol (NORMODYNE,TRANDATE) injection 10 mg, 10 mg, Intravenous, Q6H PRN, Shira Cadena MD, 10 mg at 03/17/24 1151    levothyroxine (SYNTHROID, LEVOTHROID) tablet 50 mcg, 50 mcg, Oral, Q AM, Anmol Chavez MD, 50 mcg at 03/20/24 0616    metFORMIN ER (GLUCOPHAGE-XR) 24 hr tablet 1,000 mg, 1,000 mg, Oral, Daily With Breakfast, Anmol Chavez MD, 1,000 mg at 03/20/24 0735    methylnaltrexone (RELISTOR) injection 12 mg, 12 mg, Subcutaneous, Once, Claire Lund, APRN    morphine injection 4 mg, 4 mg, Intravenous, Q2H PRN, 4 mg at 03/18/24 0823 **AND** naloxone (NARCAN) injection 0.4 mg, 0.4 mg, Intravenous, Q5 Min PRN, Antoine Ivey MD    OLANZapine (zyPREXA) 5 mg in sterile water (preservative free) 1 mL injection, 5 mg, Intramuscular, Q8H PRN, Greg Nevarez MD, 5 mg at 03/15/24 0153    ondansetron ODT (ZOFRAN-ODT) disintegrating tablet 4 mg, 4 mg, Oral, Q6H PRN **OR** ondansetron (ZOFRAN) injection 4 mg, 4 mg, Intravenous, Q6H PRN, Antoine Ivye  MD Niraj    pantoprazole (PROTONIX) injection 40 mg, 40 mg, Intravenous, BID AC, Trixie Crawford, APRN, 40 mg at 03/20/24 0736    PATIENT SUPPLIED MEDICATION, 10 mg, Oral, Daily, Anmol Chavez MD    PATIENT SUPPLIED MEDICATION, 400 mg, Oral, Daily, Anmol Chavez MD    piperacillin-tazobactam (ZOSYN) 4.5 g IVPB in 100 mL NS MBP (CD), 4.5 g, Intravenous, Q8H, Shira Cadena MD, 4.5 g at 03/20/24 0132    pregabalin (LYRICA) capsule 50 mg, 50 mg, Oral, BID, Anmol Chavez MD, 50 mg at 03/20/24 0006    sodium chloride 0.9 % infusion, 100 mL/hr, Intravenous, Continuous, Antoine Ivey MD, Last Rate: 100 mL/hr at 03/20/24 0412, 100 mL/hr at 03/20/24 0412    I have reviewed medication list.    Assessment & Plan     Principal Problem:    Gallstones  -  S/P  open Duodenal Fistula repair and cholecystectomy , doing well,   Active Problems:    Duodenal fistula  - s/p Repair , doing well, tolerating po well, JOSETTE drain in position     DM - accu check and SSI    COPD - Stable    HTN - Stable     Bipolar with depression - continue home medicine     Tobacco abuse - counseled to quit smoking    Stress ulcer/DVT prophylaxis               Plan for disposition: Home at discharge    Anmol Chavez MD  03/20/24  08:29 EDT

## 2024-03-21 ENCOUNTER — TELEPHONE (OUTPATIENT)
Dept: SURGERY | Facility: CLINIC | Age: 44
End: 2024-03-21
Payer: MEDICARE

## 2024-03-21 ENCOUNTER — READMISSION MANAGEMENT (OUTPATIENT)
Dept: CALL CENTER | Facility: HOSPITAL | Age: 44
End: 2024-03-21
Payer: MEDICARE

## 2024-03-21 NOTE — OUTREACH NOTE
Prep Survey      Flowsheet Row Responses   [facility] facility patient discharged from? [facility]   Is LACE score < 7 ? No   Eligibility Readm Mgmt   Discharge diagnosis LAPAROSCOPIC cholecystectomy   Does the patient have one of the following disease processes/diagnoses(primary or secondary)? General Surgery   Prep survey completed? Yes            Maddi CHOI - Registered Nurse

## 2024-03-22 NOTE — TELEPHONE ENCOUNTER
PO FU Call- spoke with pt. Already had 2 wk po appt. Will fu then. Knows to call with any questions or concerns.      States doing well. Fang drain  yesterday. Informed pts mother phillip can take off and use guaze and change as needed. Stated understood.

## 2024-03-23 NOTE — PROGRESS NOTES
LOS: 6 days   Patient Care Team:  Diann Wallace MD as PCP - General (Family Medicine)  Antoine Ivey MD as Surgeon (General Surgery)        Subjective  - sitting on chair, tolerating liquid well    Interval History:     Patient Complaints: nausea    Review of Systems   Constitutional:  Positive for appetite change and fatigue.   Eyes: Negative.    Respiratory: Negative.     Cardiovascular: Negative.    Gastrointestinal:  Positive for nausea.   Endocrine: Negative.    Genitourinary: Negative.    Musculoskeletal: Negative.    Skin: Negative.    Neurological: Negative.    Psychiatric/Behavioral:  Positive for behavioral problems and sleep disturbance. The patient is nervous/anxious.         Objective     Vital Signs       Physical Exam:     General Appearance:    Alert, cooperative, in no acute distress   Ears:    Ears appear intact with no abnormalities noted   Throat:   No oral lesions, no thrush, oral mucosa moist   Neck:   No adenopathy, supple, trachea midline, no thyromegaly, no   carotid bruit, no JVD   Lungs:     Clear to auscultation, respirations regular, even and              Unlabored     Heart:    Regular rhythm and normal rate, normal S1 and S2, no          murmur, no gallop, no rub, no click   Abdomen:     Normal bowel sounds, no masses, no organomegaly, soft      nontender, nondistended, no guarding, no rebound                tenderness   Extremities:   Moves all extremities well, no edema, no cyanosis, no           redness   Skin:   No bleeding, bruising or rash   Neurologic:   Cranial nerves 2 - 12 grossly intact, sensation intact, DTR       present and equal bilaterally        Results Review:    Lab Results (last 24 hours)       Procedure Component Value Units Date/Time    POC Glucose Once [[id number]]  (Normal) Collected: 03/20/24 1125    Specimen: Blood Updated: 03/20/24 1126     Glucose 93 mg/dL      Comment: Serial Number: [id number]:  [id number]       POC Glucose Once  [[id number]]  (Abnormal) Collected: 03/20/24 0732    Specimen: Blood Updated: 03/20/24 0734     Glucose 118 mg/dL      Comment: Serial Number: [id number]:  [id number]       POC Glucose Once [[id number]]  (Normal) Collected: 03/19/24 1653    Specimen: Blood Updated: 03/19/24 1655     Glucose 101 mg/dL      Comment: Serial Number: [id number]:  [id number]              Imaging Results (Last 24 Hours)       ** No results found for the last 24 hours. **             I reviewed the patient's other test results and agree with the interpretation    Medication Review:   No current facility-administered medications for this encounter.    Current Outpatient Medications:     albuterol sulfate  (90 Base) MCG/ACT inhaler, Inhale 2 puffs Every 4 (Four) Hours As Needed for Wheezing or Shortness of Air., Disp: , Rfl:     azelastine (ASTELIN) 0.1 % nasal spray, 1 spray into the nostril(s) as directed by provider 2 (Two) Times a Day. Use in each nostril as directed, Disp: , Rfl:     Breztri Aerosphere 160-9-4.8 MCG/ACT aerosol inhaler, Inhale 2 puffs 2 (Two) Times a Day., Disp: , Rfl:     buPROPion XL (WELLBUTRIN XL) 150 MG 24 hr tablet, Take 1 tablet by mouth Every Morning., Disp: , Rfl:     DULoxetine (CYMBALTA) 60 MG capsule, Take 1 capsule by mouth 2 (Two) Times a Day., Disp: , Rfl:     fexofenadine (ALLEGRA) 180 MG tablet, Take 1 tablet by mouth Daily., Disp: , Rfl:     glimepiride (AMARYL) 4 MG tablet, Take 1 tablet by mouth 2 (Two) Times a Day With Meals., Disp: , Rfl:     levothyroxine (SYNTHROID, LEVOTHROID) 50 MCG tablet, Take 1 tablet by mouth Daily., Disp: , Rfl:     losartan-hydrochlorothiazide (HYZAAR) 100-25 MG per tablet, Take 1 tablet by mouth Daily., Disp: , Rfl:     metFORMIN ER (GLUCOPHAGE-XR) 500 MG 24 hr tablet, Take 2 tablets by mouth Daily With Breakfast., Disp: , Rfl:     pregabalin (LYRICA) 50 MG capsule, Take 1 capsule by mouth 2 (Two) Times a Day., Disp: , Rfl:     Dapagliflozin-sAXagliptin  (Qtern) 10-5 MG tablet, Take 1 tablet by mouth Daily., Disp: , Rfl:     Finerenone (Kerendia) 10 MG tablet, Take 1 tablet by mouth Daily., Disp: , Rfl:     HYDROcodone-acetaminophen (NORCO) 5-325 MG per tablet, Take 1 tablet by mouth Every 4 (Four) Hours As Needed (Pain)., Disp: 30 tablet, Rfl: 0    Bhfplwve-Tckxulqfe-Ayriujadrv (Vosevi) 400-100-100 MG tablet, Take 1 tablet by mouth Daily., Disp: , Rfl:     I have reviewed medication list.    Assessment & Plan     Principal Problem:    Gallstones  -  S/P  open Duodenal Fistula repair with cholecystectomy , doing well,   Active Problems:    Duodenal fistula  - s/p Repair , doing well, tolerating clear liquid, , JOSETTE drain in position     DM - accu check and SSI    COPD - Stable    HTN - Stable     Bipolar with depression - continue home medicine     Tobacco abuse - counseled to quit smoking    Stress ulcer/DVT prophylaxis               Plan for disposition: Home at discharge    Anmol Chavez MD  03/23/24  17:21 EDT

## 2024-03-26 ENCOUNTER — READMISSION MANAGEMENT (OUTPATIENT)
Dept: CALL CENTER | Facility: HOSPITAL | Age: 44
End: 2024-03-26
Payer: MEDICARE

## 2024-03-26 DIAGNOSIS — K31.6 DUODENAL FISTULA: Primary | ICD-10-CM

## 2024-03-26 RX ORDER — HYDROCODONE BITARTRATE AND ACETAMINOPHEN 5; 325 MG/1; MG/1
1 TABLET ORAL EVERY 6 HOURS PRN
Qty: 10 TABLET | Refills: 0 | Status: SHIPPED | OUTPATIENT
Start: 2024-03-26

## 2024-03-26 NOTE — OUTREACH NOTE
General Surgery Week 1 Survey      Flowsheet Row Responses   [facility] facility patient discharged from? [facility]   Does the patient have one of the following disease processes/diagnoses(primary or secondary)? General Surgery   Week 1 attempt successful? No   Unsuccessful attempts Attempt 1            Maddi CHOI - Registered Nurse

## 2024-03-27 ENCOUNTER — OFFICE VISIT (OUTPATIENT)
Dept: SURGERY | Facility: CLINIC | Age: 44
End: 2024-03-27
Payer: MEDICARE

## 2024-03-27 VITALS
RESPIRATION RATE: 18 BRPM | SYSTOLIC BLOOD PRESSURE: 113 MMHG | BODY MASS INDEX: 37.98 KG/M2 | WEIGHT: 286.6 LBS | HEART RATE: 98 BPM | OXYGEN SATURATION: 95 % | HEIGHT: 73 IN | DIASTOLIC BLOOD PRESSURE: 84 MMHG | TEMPERATURE: 97.3 F

## 2024-03-27 DIAGNOSIS — K31.6 DUODENAL FISTULA: Primary | ICD-10-CM

## 2024-03-27 PROCEDURE — 1159F MED LIST DOCD IN RCRD: CPT | Performed by: SURGERY

## 2024-03-27 PROCEDURE — 1160F RVW MEDS BY RX/DR IN RCRD: CPT | Performed by: SURGERY

## 2024-03-27 PROCEDURE — 99024 POSTOP FOLLOW-UP VISIT: CPT | Performed by: SURGERY

## 2024-04-03 ENCOUNTER — READMISSION MANAGEMENT (OUTPATIENT)
Dept: CALL CENTER | Facility: HOSPITAL | Age: 44
End: 2024-04-03
Payer: MEDICARE

## 2024-04-03 NOTE — OUTREACH NOTE
General Surgery Week 2 Survey      Flowsheet Row Responses   [facility] patient discharged from? [facility]   Does the patient have one of the following disease processes/diagnoses(primary or secondary)? General Surgery   Week 2 attempt successful? Yes   Call start time 1031   Call end time 1033   Discharge diagnosis LAPAROSCOPIC cholecystectomy   Meds reviewed with patient/caregiver? Yes   Is the patient taking all medications as directed (includes completed medication regime)? Yes   Does the patient have a follow up appointment scheduled with their surgeon? Yes   Has the patient kept scheduled appointments due by today? Yes   Has home health visited the patient within 72 hours of discharge? N/A   Psychosocial issues? No   Did the patient receive a copy of their discharge instructions? Yes   Nursing interventions Reviewed instructions with patient   What is the patient's perception of their health status since discharge? Improving   Nursing interventions Nurse provided patient education   Is the patient /caregiver able to teach back basic post-op care? Lifting as instructed by MD in discharge instructions   Is the patient/caregiver able to teach back signs and symptoms of incisional infection? Fever, Pus or odor from incision, Incisional warmth, Increased drainage or bleeding, Increased redness, swelling or pain at the incisonal site   Is the patient/caregiver able to teach back the hierarchy of who to call/visit for symptoms/problems? PCP, Specialist, Home health nurse, Urgent Care, ED, 911 Yes   Week 2 call completed? Yes   Revoked No further contact(revokes)-requires comment   Graduated/Revoked comments Pt reports he is doing well. No issues with surgical site. Reports he does not need another FU call.   Call end time 1033            JUDY TINOCO - Registered Nurse

## 2024-04-08 NOTE — PROGRESS NOTES
"Post-op Note    Subjective   Shaun Shanks is a 43 y.o. male status post laparoscopic cholecystectomy converted to exploratory laparotomy with repair of duodenal fistula with modified Reilly patch on 3/14/2024.  Overall, the patient reports that he is doing okay.  He has been intervening his drain every morning and that it fills with serosanguineous fluid.  He is still having some tenderness in the right upper quadrant.  He has not noticed any nausea, vomiting, fevers, chills or green discoloration to his output.      Objective   /84 (BP Location: Left arm, Patient Position: Sitting, Cuff Size: Large Adult)   Pulse 98   Temp 97.3 °F (36.3 °C) (Infrared)   Resp 18   Ht 185.4 cm (73\")   Wt 130 kg (286 lb 9.6 oz)   SpO2 95%   BMI 37.81 kg/m²   Incision is well-approximated with skin ashlee.  JOSETTE drain is serosanguineous removed.      Assessment & Plan   Patient is a 43-year-old gentleman status post laparoscopic cholecystectomy converted to exploratory laparotomy repair of duodenal fistula with modified Reilly patch on 3/14/2024.    Discussed with patient that we performed a subtotal cholecystectomy without closure of his gallbladder.  However he is not having any signs of bile leak at the moment.  Drain removed at bedside without any complications.  Recommend local wound care to the site  Regular diet as tolerated  Follow-up with me in 1 month to ensure he has returned to normal.    Antoine Ivey MD  4/8/2024  13:54 EDT  "

## 2024-04-23 ENCOUNTER — TELEPHONE (OUTPATIENT)
Dept: SURGERY | Facility: CLINIC | Age: 44
End: 2024-04-23
Payer: MEDICARE

## 2024-04-23 NOTE — TELEPHONE ENCOUNTER
Left message on machine to call back with mother.     Spoke with pt. States will have mother call me back to adonay.

## 2024-04-23 NOTE — TELEPHONE ENCOUNTER
Hub staff attempted to follow warm transfer process and was unsuccessful     Caller: Darlene Solitario    Relationship to patient: Mother    Best call back number: [phone]  PT IS AVAILABLE AT ANYTIME, IF NO ANSWER A DETAILED MSG CAN BE     Patient is needing: PT IS WANTING TO KNOW IF HE CAN RS HIS POST OP APPT FOR MAY 22, IN THE AFTERNOON IF POSSIBLE.

## 2024-05-08 ENCOUNTER — OFFICE VISIT (OUTPATIENT)
Dept: SURGERY | Facility: CLINIC | Age: 44
End: 2024-05-08
Payer: MEDICARE

## 2024-05-08 VITALS
HEIGHT: 73 IN | HEART RATE: 87 BPM | TEMPERATURE: 98.4 F | WEIGHT: 293.4 LBS | OXYGEN SATURATION: 96 % | DIASTOLIC BLOOD PRESSURE: 97 MMHG | BODY MASS INDEX: 38.88 KG/M2 | SYSTOLIC BLOOD PRESSURE: 130 MMHG

## 2024-05-08 DIAGNOSIS — K31.6 DUODENAL FISTULA: Primary | ICD-10-CM

## 2024-05-08 PROCEDURE — 1159F MED LIST DOCD IN RCRD: CPT | Performed by: SURGERY

## 2024-05-08 PROCEDURE — 99024 POSTOP FOLLOW-UP VISIT: CPT | Performed by: SURGERY

## 2024-05-08 PROCEDURE — 1160F RVW MEDS BY RX/DR IN RCRD: CPT | Performed by: SURGERY

## 2024-05-08 RX ORDER — FAMOTIDINE 40 MG/1
40 TABLET, FILM COATED ORAL
COMMUNITY
Start: 2024-03-23

## 2024-05-08 RX ORDER — ONDANSETRON 4 MG/1
TABLET, ORALLY DISINTEGRATING ORAL
COMMUNITY
Start: 2024-03-23

## 2024-06-18 ENCOUNTER — OFFICE (OUTPATIENT)
Dept: URBAN - METROPOLITAN AREA CLINIC 64 | Facility: CLINIC | Age: 44
End: 2024-06-18
Payer: MEDICAID

## 2024-06-18 VITALS
HEART RATE: 78 BPM | SYSTOLIC BLOOD PRESSURE: 131 MMHG | HEIGHT: 74 IN | DIASTOLIC BLOOD PRESSURE: 78 MMHG | WEIGHT: 293 LBS

## 2024-06-18 DIAGNOSIS — R79.89 OTHER SPECIFIED ABNORMAL FINDINGS OF BLOOD CHEMISTRY: ICD-10-CM

## 2024-06-18 DIAGNOSIS — R76.8 OTHER SPECIFIED ABNORMAL IMMUNOLOGICAL FINDINGS IN SERUM: ICD-10-CM

## 2024-06-18 DIAGNOSIS — K80.20 CALCULUS OF GALLBLADDER WITHOUT CHOLECYSTITIS WITHOUT OBSTRU: ICD-10-CM

## 2024-06-18 DIAGNOSIS — B18.2 CHRONIC VIRAL HEPATITIS C: ICD-10-CM

## 2024-06-18 PROCEDURE — 99213 OFFICE O/P EST LOW 20 MIN: CPT | Performed by: NURSE PRACTITIONER

## (undated) DEVICE — GLV SURG SIGNATURE ESSENTIAL PF LTX SZ8

## (undated) DEVICE — INSUFFLATION TUBING SET, ENDOFLATOR 50: Brand: N.A.

## (undated) DEVICE — TUBING, SUCTION, 1/4" X 12', STRAIGHT: Brand: MEDLINE

## (undated) DEVICE — SOLUTION,WATER,IRRIGATION,1000ML,STERILE: Brand: MEDLINE

## (undated) DEVICE — PROXIMATE RH ROTATING HEAD SKIN STAPLERS (35 WIDE) CONTAINS 35 STAINLESS STEEL STAPLES: Brand: PROXIMATE

## (undated) DEVICE — ELECTRD BLD EZ CLN MOD 6.5IN

## (undated) DEVICE — PICO 7 10CM X 30CM: Brand: PICO™ 7

## (undated) DEVICE — ADHS SKIN PREMIERPRO EXOFIN TOPICAL HI/VISC .5ML

## (undated) DEVICE — ENDOPATH 5MM CURVED SCISSORS WITH MONOPOLAR CAUTERY: Brand: ENDOPATH

## (undated) DEVICE — SUT 1/0 27 PDS II VIO MONO CT Z353H

## (undated) DEVICE — SOL IRRIG NACL 1000ML

## (undated) DEVICE — SUT ETHLN 2/0 PS 18IN 585H

## (undated) DEVICE — ANTIBACTERIAL UNDYED BRAIDED (POLYGLACTIN 910), SYNTHETIC ABSORBABLE SUTURE: Brand: COATED VICRYL

## (undated) DEVICE — SLV SCD CALF HEMOFORCE DVT THERP REPROC MD

## (undated) DEVICE — ENDOPATH XCEL WITH OPTIVIEW TECHNOLOGY UNIVERSAL TROCAR STABILITY SLEEVES: Brand: ENDOPATH XCEL OPTIVIEW

## (undated) DEVICE — UNDERGLV SURG BIOGEL/PI PF SYNTH SURG SZ8.5 BLU 50/BX

## (undated) DEVICE — DRN WND EVAC BULB 100CC

## (undated) DEVICE — PENCL HND ROCKRSWTCH HOLSTR EZ CLEAN TP CRD 10FT

## (undated) DEVICE — YANKAUER,BULB TIP,W/O VENT,RIGID,STERILE: Brand: MEDLINE

## (undated) DEVICE — INTENDED FOR TISSUE SEPARATION, AND OTHER PROCEDURES THAT REQUIRE A SHARP SURGICAL BLADE TO PUNCTURE OR CUT.: Brand: BARD-PARKER ® STAINLESS STEEL BLADES

## (undated) DEVICE — GENERAL LAPAROSCOPY CDS: Brand: MEDLINE INDUSTRIES, INC.

## (undated) DEVICE — 2, DISPOSABLE SUCTION/IRRIGATOR WITH DISPOSABLE TIP: Brand: STRYKEFLOW

## (undated) DEVICE — SPNG LAP PREWSH SFTPK 18X18IN STRL PK/5

## (undated) DEVICE — ENDOPATH XCEL WITH OPTIVIEW TECHNOLOGY BLADELESS TROCARS WITH STABILITY SLEEVES: Brand: ENDOPATH XCEL OPTIVIEW

## (undated) DEVICE — BLANKT WARM UPPR/BDY ARM/OUT 57X196CM

## (undated) DEVICE — DRSNG WND BORDR/ADHS NONADHR/GZ LF 4X4IN STRL

## (undated) DEVICE — SUT VIC 0 UR6 27IN VCP603H

## (undated) DEVICE — BLAKE SILICONE DRAIN, 19 FR ROUND, HUBLESS WITH 1/4" BENDABLE TROCAR: Brand: BLAKE

## (undated) DEVICE — GOWN,REINFRCE,POLY,SIRUS,BREATH SLV,XXLG: Brand: MEDLINE

## (undated) DEVICE — ENDOPATH XCEL BLUNT TIP TROCARS WITH SMOOTH SLEEVES: Brand: ENDOPATH XCEL

## (undated) DEVICE — KT SURG TURNOVER 050